# Patient Record
Sex: MALE | Race: BLACK OR AFRICAN AMERICAN
[De-identification: names, ages, dates, MRNs, and addresses within clinical notes are randomized per-mention and may not be internally consistent; named-entity substitution may affect disease eponyms.]

---

## 2020-12-11 ENCOUNTER — HOSPITAL ENCOUNTER (INPATIENT)
Dept: HOSPITAL 35 - ER | Age: 70
LOS: 4 days | Discharge: NURSING, REHAB OR LONG-TERM CARE | DRG: 871 | End: 2020-12-15
Attending: HOSPITALIST | Admitting: HOSPITALIST
Payer: COMMERCIAL

## 2020-12-11 VITALS — SYSTOLIC BLOOD PRESSURE: 110 MMHG | DIASTOLIC BLOOD PRESSURE: 61 MMHG

## 2020-12-11 VITALS — DIASTOLIC BLOOD PRESSURE: 63 MMHG | SYSTOLIC BLOOD PRESSURE: 135 MMHG

## 2020-12-11 VITALS — DIASTOLIC BLOOD PRESSURE: 61 MMHG | SYSTOLIC BLOOD PRESSURE: 118 MMHG

## 2020-12-11 VITALS — DIASTOLIC BLOOD PRESSURE: 64 MMHG | SYSTOLIC BLOOD PRESSURE: 106 MMHG

## 2020-12-11 VITALS — DIASTOLIC BLOOD PRESSURE: 60 MMHG | SYSTOLIC BLOOD PRESSURE: 112 MMHG

## 2020-12-11 VITALS — DIASTOLIC BLOOD PRESSURE: 71 MMHG | SYSTOLIC BLOOD PRESSURE: 123 MMHG

## 2020-12-11 VITALS — DIASTOLIC BLOOD PRESSURE: 64 MMHG | SYSTOLIC BLOOD PRESSURE: 126 MMHG

## 2020-12-11 VITALS — DIASTOLIC BLOOD PRESSURE: 56 MMHG | SYSTOLIC BLOOD PRESSURE: 88 MMHG

## 2020-12-11 VITALS — SYSTOLIC BLOOD PRESSURE: 113 MMHG | DIASTOLIC BLOOD PRESSURE: 69 MMHG

## 2020-12-11 VITALS — DIASTOLIC BLOOD PRESSURE: 69 MMHG | SYSTOLIC BLOOD PRESSURE: 106 MMHG

## 2020-12-11 VITALS — SYSTOLIC BLOOD PRESSURE: 125 MMHG | DIASTOLIC BLOOD PRESSURE: 74 MMHG

## 2020-12-11 VITALS — SYSTOLIC BLOOD PRESSURE: 109 MMHG | DIASTOLIC BLOOD PRESSURE: 61 MMHG

## 2020-12-11 VITALS — SYSTOLIC BLOOD PRESSURE: 129 MMHG | DIASTOLIC BLOOD PRESSURE: 69 MMHG

## 2020-12-11 VITALS — SYSTOLIC BLOOD PRESSURE: 116 MMHG | DIASTOLIC BLOOD PRESSURE: 65 MMHG

## 2020-12-11 VITALS — SYSTOLIC BLOOD PRESSURE: 105 MMHG | DIASTOLIC BLOOD PRESSURE: 61 MMHG

## 2020-12-11 VITALS — DIASTOLIC BLOOD PRESSURE: 81 MMHG | SYSTOLIC BLOOD PRESSURE: 126 MMHG

## 2020-12-11 VITALS — SYSTOLIC BLOOD PRESSURE: 91 MMHG | DIASTOLIC BLOOD PRESSURE: 58 MMHG

## 2020-12-11 VITALS — DIASTOLIC BLOOD PRESSURE: 66 MMHG | SYSTOLIC BLOOD PRESSURE: 114 MMHG

## 2020-12-11 VITALS — SYSTOLIC BLOOD PRESSURE: 120 MMHG | DIASTOLIC BLOOD PRESSURE: 65 MMHG

## 2020-12-11 VITALS — SYSTOLIC BLOOD PRESSURE: 109 MMHG | DIASTOLIC BLOOD PRESSURE: 72 MMHG

## 2020-12-11 VITALS — DIASTOLIC BLOOD PRESSURE: 66 MMHG | SYSTOLIC BLOOD PRESSURE: 122 MMHG

## 2020-12-11 VITALS — DIASTOLIC BLOOD PRESSURE: 61 MMHG | SYSTOLIC BLOOD PRESSURE: 102 MMHG

## 2020-12-11 VITALS — DIASTOLIC BLOOD PRESSURE: 61 MMHG | SYSTOLIC BLOOD PRESSURE: 142 MMHG

## 2020-12-11 VITALS — SYSTOLIC BLOOD PRESSURE: 121 MMHG | DIASTOLIC BLOOD PRESSURE: 73 MMHG

## 2020-12-11 VITALS — DIASTOLIC BLOOD PRESSURE: 67 MMHG | SYSTOLIC BLOOD PRESSURE: 119 MMHG

## 2020-12-11 VITALS — SYSTOLIC BLOOD PRESSURE: 113 MMHG | DIASTOLIC BLOOD PRESSURE: 75 MMHG

## 2020-12-11 VITALS — DIASTOLIC BLOOD PRESSURE: 68 MMHG | SYSTOLIC BLOOD PRESSURE: 113 MMHG

## 2020-12-11 VITALS — DIASTOLIC BLOOD PRESSURE: 63 MMHG | SYSTOLIC BLOOD PRESSURE: 117 MMHG

## 2020-12-11 VITALS — DIASTOLIC BLOOD PRESSURE: 72 MMHG | SYSTOLIC BLOOD PRESSURE: 124 MMHG

## 2020-12-11 VITALS — DIASTOLIC BLOOD PRESSURE: 75 MMHG | SYSTOLIC BLOOD PRESSURE: 133 MMHG

## 2020-12-11 VITALS — DIASTOLIC BLOOD PRESSURE: 70 MMHG | SYSTOLIC BLOOD PRESSURE: 118 MMHG

## 2020-12-11 VITALS — SYSTOLIC BLOOD PRESSURE: 120 MMHG | DIASTOLIC BLOOD PRESSURE: 68 MMHG

## 2020-12-11 VITALS — SYSTOLIC BLOOD PRESSURE: 114 MMHG | DIASTOLIC BLOOD PRESSURE: 72 MMHG

## 2020-12-11 VITALS — DIASTOLIC BLOOD PRESSURE: 77 MMHG | SYSTOLIC BLOOD PRESSURE: 141 MMHG

## 2020-12-11 VITALS — DIASTOLIC BLOOD PRESSURE: 64 MMHG | SYSTOLIC BLOOD PRESSURE: 127 MMHG

## 2020-12-11 VITALS — SYSTOLIC BLOOD PRESSURE: 115 MMHG | DIASTOLIC BLOOD PRESSURE: 67 MMHG

## 2020-12-11 VITALS — DIASTOLIC BLOOD PRESSURE: 72 MMHG | SYSTOLIC BLOOD PRESSURE: 135 MMHG

## 2020-12-11 VITALS — DIASTOLIC BLOOD PRESSURE: 72 MMHG | SYSTOLIC BLOOD PRESSURE: 128 MMHG

## 2020-12-11 VITALS — DIASTOLIC BLOOD PRESSURE: 60 MMHG | SYSTOLIC BLOOD PRESSURE: 96 MMHG

## 2020-12-11 VITALS — SYSTOLIC BLOOD PRESSURE: 122 MMHG | DIASTOLIC BLOOD PRESSURE: 62 MMHG

## 2020-12-11 VITALS — SYSTOLIC BLOOD PRESSURE: 88 MMHG | DIASTOLIC BLOOD PRESSURE: 58 MMHG

## 2020-12-11 VITALS — DIASTOLIC BLOOD PRESSURE: 69 MMHG | SYSTOLIC BLOOD PRESSURE: 122 MMHG

## 2020-12-11 VITALS — SYSTOLIC BLOOD PRESSURE: 95 MMHG | DIASTOLIC BLOOD PRESSURE: 59 MMHG

## 2020-12-11 VITALS — BODY MASS INDEX: 16.9 KG/M2 | HEIGHT: 72 IN | WEIGHT: 124.78 LBS

## 2020-12-11 VITALS — SYSTOLIC BLOOD PRESSURE: 108 MMHG | DIASTOLIC BLOOD PRESSURE: 69 MMHG

## 2020-12-11 VITALS — SYSTOLIC BLOOD PRESSURE: 135 MMHG | DIASTOLIC BLOOD PRESSURE: 69 MMHG

## 2020-12-11 VITALS — SYSTOLIC BLOOD PRESSURE: 110 MMHG | DIASTOLIC BLOOD PRESSURE: 67 MMHG

## 2020-12-11 VITALS — DIASTOLIC BLOOD PRESSURE: 74 MMHG | SYSTOLIC BLOOD PRESSURE: 123 MMHG

## 2020-12-11 VITALS — SYSTOLIC BLOOD PRESSURE: 117 MMHG | DIASTOLIC BLOOD PRESSURE: 79 MMHG

## 2020-12-11 VITALS — SYSTOLIC BLOOD PRESSURE: 140 MMHG | DIASTOLIC BLOOD PRESSURE: 81 MMHG

## 2020-12-11 VITALS — DIASTOLIC BLOOD PRESSURE: 68 MMHG | SYSTOLIC BLOOD PRESSURE: 123 MMHG

## 2020-12-11 VITALS — DIASTOLIC BLOOD PRESSURE: 62 MMHG | SYSTOLIC BLOOD PRESSURE: 107 MMHG

## 2020-12-11 VITALS — DIASTOLIC BLOOD PRESSURE: 71 MMHG | SYSTOLIC BLOOD PRESSURE: 106 MMHG

## 2020-12-11 VITALS — SYSTOLIC BLOOD PRESSURE: 125 MMHG | DIASTOLIC BLOOD PRESSURE: 66 MMHG

## 2020-12-11 VITALS — DIASTOLIC BLOOD PRESSURE: 66 MMHG | SYSTOLIC BLOOD PRESSURE: 115 MMHG

## 2020-12-11 VITALS — SYSTOLIC BLOOD PRESSURE: 137 MMHG | DIASTOLIC BLOOD PRESSURE: 72 MMHG

## 2020-12-11 VITALS — SYSTOLIC BLOOD PRESSURE: 118 MMHG | DIASTOLIC BLOOD PRESSURE: 65 MMHG

## 2020-12-11 VITALS — DIASTOLIC BLOOD PRESSURE: 63 MMHG | SYSTOLIC BLOOD PRESSURE: 111 MMHG

## 2020-12-11 VITALS — DIASTOLIC BLOOD PRESSURE: 94 MMHG | SYSTOLIC BLOOD PRESSURE: 120 MMHG

## 2020-12-11 VITALS — SYSTOLIC BLOOD PRESSURE: 124 MMHG | DIASTOLIC BLOOD PRESSURE: 64 MMHG

## 2020-12-11 VITALS — SYSTOLIC BLOOD PRESSURE: 126 MMHG | DIASTOLIC BLOOD PRESSURE: 70 MMHG

## 2020-12-11 VITALS — SYSTOLIC BLOOD PRESSURE: 129 MMHG | DIASTOLIC BLOOD PRESSURE: 74 MMHG

## 2020-12-11 VITALS — DIASTOLIC BLOOD PRESSURE: 67 MMHG | SYSTOLIC BLOOD PRESSURE: 118 MMHG

## 2020-12-11 VITALS — DIASTOLIC BLOOD PRESSURE: 71 MMHG | SYSTOLIC BLOOD PRESSURE: 124 MMHG

## 2020-12-11 VITALS — SYSTOLIC BLOOD PRESSURE: 135 MMHG | DIASTOLIC BLOOD PRESSURE: 79 MMHG

## 2020-12-11 VITALS — SYSTOLIC BLOOD PRESSURE: 119 MMHG | DIASTOLIC BLOOD PRESSURE: 68 MMHG

## 2020-12-11 VITALS — DIASTOLIC BLOOD PRESSURE: 59 MMHG | SYSTOLIC BLOOD PRESSURE: 107 MMHG

## 2020-12-11 VITALS — SYSTOLIC BLOOD PRESSURE: 136 MMHG | DIASTOLIC BLOOD PRESSURE: 64 MMHG

## 2020-12-11 VITALS — DIASTOLIC BLOOD PRESSURE: 65 MMHG | SYSTOLIC BLOOD PRESSURE: 120 MMHG

## 2020-12-11 VITALS — SYSTOLIC BLOOD PRESSURE: 112 MMHG | DIASTOLIC BLOOD PRESSURE: 63 MMHG

## 2020-12-11 VITALS — SYSTOLIC BLOOD PRESSURE: 115 MMHG | DIASTOLIC BLOOD PRESSURE: 71 MMHG

## 2020-12-11 VITALS — SYSTOLIC BLOOD PRESSURE: 135 MMHG | DIASTOLIC BLOOD PRESSURE: 63 MMHG

## 2020-12-11 VITALS — DIASTOLIC BLOOD PRESSURE: 67 MMHG | SYSTOLIC BLOOD PRESSURE: 113 MMHG

## 2020-12-11 VITALS — DIASTOLIC BLOOD PRESSURE: 70 MMHG | SYSTOLIC BLOOD PRESSURE: 120 MMHG

## 2020-12-11 VITALS — DIASTOLIC BLOOD PRESSURE: 61 MMHG | SYSTOLIC BLOOD PRESSURE: 120 MMHG

## 2020-12-11 VITALS — DIASTOLIC BLOOD PRESSURE: 62 MMHG | SYSTOLIC BLOOD PRESSURE: 104 MMHG

## 2020-12-11 DIAGNOSIS — Z66: ICD-10-CM

## 2020-12-11 DIAGNOSIS — I95.9: ICD-10-CM

## 2020-12-11 DIAGNOSIS — J91.8: ICD-10-CM

## 2020-12-11 DIAGNOSIS — D64.9: ICD-10-CM

## 2020-12-11 DIAGNOSIS — E87.1: ICD-10-CM

## 2020-12-11 DIAGNOSIS — E11.622: ICD-10-CM

## 2020-12-11 DIAGNOSIS — L89.154: ICD-10-CM

## 2020-12-11 DIAGNOSIS — Z93.1: ICD-10-CM

## 2020-12-11 DIAGNOSIS — D69.6: ICD-10-CM

## 2020-12-11 DIAGNOSIS — Z95.0: ICD-10-CM

## 2020-12-11 DIAGNOSIS — E78.5: ICD-10-CM

## 2020-12-11 DIAGNOSIS — E43: ICD-10-CM

## 2020-12-11 DIAGNOSIS — Z93.3: ICD-10-CM

## 2020-12-11 DIAGNOSIS — Z20.828: ICD-10-CM

## 2020-12-11 DIAGNOSIS — Z95.5: ICD-10-CM

## 2020-12-11 DIAGNOSIS — I25.10: ICD-10-CM

## 2020-12-11 DIAGNOSIS — Z86.73: ICD-10-CM

## 2020-12-11 DIAGNOSIS — I25.2: ICD-10-CM

## 2020-12-11 DIAGNOSIS — J96.21: ICD-10-CM

## 2020-12-11 DIAGNOSIS — Z79.899: ICD-10-CM

## 2020-12-11 DIAGNOSIS — J18.9: ICD-10-CM

## 2020-12-11 DIAGNOSIS — N17.9: ICD-10-CM

## 2020-12-11 DIAGNOSIS — A41.9: Primary | ICD-10-CM

## 2020-12-11 DIAGNOSIS — R13.10: ICD-10-CM

## 2020-12-11 DIAGNOSIS — Z51.5: ICD-10-CM

## 2020-12-11 DIAGNOSIS — I25.5: ICD-10-CM

## 2020-12-11 DIAGNOSIS — I10: ICD-10-CM

## 2020-12-11 DIAGNOSIS — K21.9: ICD-10-CM

## 2020-12-11 DIAGNOSIS — Z93.0: ICD-10-CM

## 2020-12-11 DIAGNOSIS — Z79.4: ICD-10-CM

## 2020-12-11 LAB
ALBUMIN SERPL-MCNC: 1.8 G/DL (ref 3.4–5)
ALT SERPL-CCNC: 31 U/L (ref 30–65)
ANION GAP SERPL CALC-SCNC: 6 MMOL/L (ref 7–16)
ANION GAP SERPL CALC-SCNC: 8 MMOL/L (ref 7–16)
ANION GAP SERPL CALC-SCNC: 8 MMOL/L (ref 7–16)
ANION GAP SERPL CALC-SCNC: 9 MMOL/L (ref 7–16)
APTT BLD: 28.7 SECONDS (ref 24.5–32.8)
AST SERPL-CCNC: 25 U/L (ref 15–37)
BACTERIA-REFLEX: (no result) /HPF
BASOPHILS NFR BLD AUTO: 0.4 % (ref 0–2)
BE(VIVO): 2.9 MMOL/L
BILIRUB SERPL-MCNC: 0.3 MG/DL (ref 0.2–1)
BILIRUB UR-MCNC: NEGATIVE MG/DL
BUN SERPL-MCNC: 34 MG/DL (ref 7–18)
BUN SERPL-MCNC: 35 MG/DL (ref 7–18)
BUN SERPL-MCNC: 36 MG/DL (ref 7–18)
BUN SERPL-MCNC: 41 MG/DL (ref 7–18)
CALCIUM SERPL-MCNC: 8.5 MG/DL (ref 8.5–10.1)
CALCIUM SERPL-MCNC: 8.8 MG/DL (ref 8.5–10.1)
CALCIUM SERPL-MCNC: 9.3 MG/DL (ref 8.5–10.1)
CALCIUM SERPL-MCNC: 9.6 MG/DL (ref 8.5–10.1)
CHLORIDE SERPL-SCNC: 100 MMOL/L (ref 98–107)
CHLORIDE SERPL-SCNC: 101 MMOL/L (ref 98–107)
CHLORIDE SERPL-SCNC: 93 MMOL/L (ref 98–107)
CHLORIDE SERPL-SCNC: 94 MMOL/L (ref 98–107)
CO2 SERPL-SCNC: 25 MMOL/L (ref 21–32)
CO2 SERPL-SCNC: 26 MMOL/L (ref 21–32)
CO2 SERPL-SCNC: 26 MMOL/L (ref 21–32)
CO2 SERPL-SCNC: 27 MMOL/L (ref 21–32)
COLOR UR: YELLOW
CREAT SERPL-MCNC: 0.8 MG/DL (ref 0.7–1.3)
CREAT SERPL-MCNC: 0.9 MG/DL (ref 0.7–1.3)
CREAT SERPL-MCNC: 0.9 MG/DL (ref 0.7–1.3)
CREAT SERPL-MCNC: 1 MG/DL (ref 0.7–1.3)
EOSINOPHIL NFR BLD: 0.4 % (ref 0–3)
ERYTHROCYTE [DISTWIDTH] IN BLOOD BY AUTOMATED COUNT: 17.1 % (ref 10.5–14.5)
GLUCOSE SERPL-MCNC: 209 MG/DL (ref 74–106)
GLUCOSE SERPL-MCNC: 267 MG/DL (ref 74–106)
GLUCOSE SERPL-MCNC: 301 MG/DL (ref 74–106)
GLUCOSE SERPL-MCNC: 78 MG/DL (ref 74–106)
GRANULOCYTES NFR BLD MANUAL: 84.9 % (ref 36–66)
HCO3 BLD-SCNC: 26 MMOL/L (ref 22–26)
HCT VFR BLD CALC: 34.3 % (ref 42–52)
HGB BLD-MCNC: 11.1 GM/DL (ref 14–18)
INR PPP: 1
KETONES UR STRIP-MCNC: NEGATIVE MG/DL
LYMPHOCYTES NFR BLD AUTO: 10.3 % (ref 24–44)
MAGNESIUM SERPL-MCNC: 1.6 MG/DL (ref 1.8–2.4)
MCH RBC QN AUTO: 28.8 PG (ref 26–34)
MCHC RBC AUTO-ENTMCNC: 32.3 G/DL (ref 28–37)
MCV RBC: 89 FL (ref 80–100)
MONOCYTES NFR BLD: 4 % (ref 1–8)
NEUTROPHILS # BLD: 15.2 THOU/UL (ref 1.4–8.2)
PCO2 BLD: 34.9 MMHG (ref 35–45)
PLATELET # BLD: 525 THOU/UL (ref 150–400)
PO2 BLD: 41.9 MMHG (ref 80–100)
POTASSIUM SERPL-SCNC: 3.9 MMOL/L (ref 3.5–5.1)
POTASSIUM SERPL-SCNC: 4.2 MMOL/L (ref 3.5–5.1)
POTASSIUM SERPL-SCNC: 5 MMOL/L (ref 3.5–5.1)
POTASSIUM SERPL-SCNC: 5.1 MMOL/L (ref 3.5–5.1)
PROT SERPL-MCNC: 6.8 G/DL (ref 6.4–8.2)
PROTHROMBIN TIME: 10.5 SECONDS (ref 9.3–11.4)
RBC # BLD AUTO: 3.86 MIL/UL (ref 4.5–6)
RBC # UR STRIP: NEGATIVE /UL
SODIUM SERPL-SCNC: 127 MMOL/L (ref 136–145)
SODIUM SERPL-SCNC: 127 MMOL/L (ref 136–145)
SODIUM SERPL-SCNC: 134 MMOL/L (ref 136–145)
SODIUM SERPL-SCNC: 135 MMOL/L (ref 136–145)
SP GR UR STRIP: 1.02 (ref 1–1.03)
SQUAMOUS: (no result) /LPF (ref 0–3)
TROPONIN I SERPL-MCNC: <0.06 NG/ML (ref ?–0.06)
URINE CLARITY: CLEAR
URINE GLUCOSE-RANDOM*: NEGATIVE
URINE LEUKOCYTES-REFLEX: (no result)
URINE NITRITE-REFLEX: NEGATIVE
URINE PROTEIN (DIPSTICK): (no result)
URINE WBC-REFLEX: (no result) /HPF (ref 0–5)
UROBILINOGEN UR STRIP-ACNC: 0.2 E.U./DL (ref 0.2–1)
WBC # BLD AUTO: 18 THOU/UL (ref 4–11)

## 2020-12-11 PROCEDURE — 0W993ZZ DRAINAGE OF RIGHT PLEURAL CAVITY, PERCUTANEOUS APPROACH: ICD-10-PCS | Performed by: RADIOLOGY

## 2020-12-11 PROCEDURE — 10078: CPT

## 2020-12-11 PROCEDURE — 10081 I&D PILONIDAL CYST COMP: CPT

## 2020-12-11 PROCEDURE — 02HV33Z INSERTION OF INFUSION DEVICE INTO SUPERIOR VENA CAVA, PERCUTANEOUS APPROACH: ICD-10-PCS | Performed by: RADIOLOGY

## 2020-12-11 NOTE — NUR
Nutrition: REC restart pt's usual regimen Glucerna 1.2 at 70 mL/hr, with 250
mL H20 flushes q 6 hrs with beneprotein in each flush.

## 2020-12-11 NOTE — NUR
chart review. new pt today. unable to visit with keron rt isolation pending
covid. cm tried calling his son, no answer. cm reached out to [facility]
[place]. spoke with renaldo at [facility]. " he is ltc, he came with that wound to
life [facility]. he is out of skilled days, he came from 2 other nursing homes and
before that he was a [facility]. we have been tying to get son to make his
comfort and dnr, son just will not make his dnr."/renaldo. he is total
assistance with adls. has trach and peg. he is ltc at [facility]. will cont
following as needed for dc needs.

## 2020-12-11 NOTE — NUR
1455- Three Hour Sepsis Reassessment:
Physician Notified: Dr. Ann
Pt Status: More alert and awake
MAP:: 80mmHg
CVP: 8
Lactate: was 2.4, new lab sent
UOP: 200ml
BMP: Next one at 1600

## 2020-12-11 NOTE — NUR
PT ARRIVED TO HE UNIT AT 1038 WITH ED RN TRANSPORT W/ O2, PT DID NOT HAVE
BELONGINGS UPON ARRIVAL. PT'S SON VICKI PADRON JR, THE DPOA, WAS ALSO
NOTIFIED OF PTS ADMISSION. ALL WOUND PICTURES WERE TAKEN WITH THE ASSISTANCE
OF TITUS RODRÍGUEZ RN, AND COMPLETE CHLORHEXEDINE BATH WAS PROVIDED, PT WAS
SEEN TO HAVE COPIOUS AMOUNT OF THICK TAN/WHITE EXPECTORANTS. PT HAD EXTENSIVE
WOUND UPON ARRIVAL TO BILATERAL HEELS, COCCYX, HIP WOUND VACUMM, PT ALSO HAD A
LLQ COLOSTOMY, AND PEG TUBE. TUBE FEEDING HAS BEEN INITIATED. PT HAS BEEN
PLACED ON SEPSIS PROTOCOL PER POLICY, MEDICATIONS WERE ADMINISTERED, BLOOD
SUGAR IS BEING CHECKED HOURLY; INSULIN GTT MANAGED, PT RECEIVED A CENTRAL
LINE, THORACENTESIS TODAY. PT IS EASILY AROUSABLE, IS ABLE TO OPEN EYES WHEN
RN CALLS NAME AND APPEARS TO BE RETURNING TO BASELINE MENTATION, AS WELL AS
MAINTAINING 100 O2 THREFORE, PT IS PROGRESSING TOWARDS DISCHARGE.

## 2020-12-11 NOTE — NUR
WOUND CONSULT;
INITAL ASSESSMENT;  A STAGE 4 SACRAL WOUND WITH UNDERMINING FROM 10-2:00.
BONE IS PRESENT IN THE WOUND BED. THE WOUND HAS A SLIGHT ODOR.  THE WOUND
MEANSURES APPROX 10 X 10 X 2.5 CM. THE LEFT ELBOW HAS A BRUISE SUPICIOUS OF A
DTI.  THE HEELS HAVE RESOLVING PRESSURE INJURIES WORSE ON THE LEFT THAN THE
RIGHT.  CURRENTLY USING PRAFO BOOTS. PICTURES TAKEN.
 
RECOMMENDATIONS;  UNTIL DR PUGH ASSESSMENT USE BORDER FOAMS TO THE HEELS A
NS MOIST GAUZE TO THE SACRUM,ABD,TAPE. BETADINE TO THE RIGHT ELBOW.
 
RN'S PRESENT

## 2020-12-11 NOTE — NUR
1220 - SPOKE WITH SON VICKI PADRON JR. TO PROVIDE UPDATE AND RETREIVE
CONSENT FOR CENTRAL LINE INSERTION BY IV TEAM. PER VICKI PUGACHARBEL  (SON),
[facility] AND
TRANSFER TO [facility], PT HAS BEEN ADMITED TO THIS HOSPITAL BEFORE IN THE PAST, AND
VICKI DUMONT(SON) IS THE DPOA FOR THE PATIENT AND SHOULD BE IN FILE. PT'S WIFE
JOHNNIE PADRNO IS  AND SHOULD NOT BE A POINT OF CONTACT.

## 2020-12-11 NOTE — NUR
VAT CONSULTED FOR CVL PLACEMENT. 6FR TL JACC CATHETER INSERTED TO RIGHT IJ,
25CM CATHETER WITH 9CM EXTERNAL. PT TOLERATED WELL. AWAITING CXR.

## 2020-12-12 VITALS — SYSTOLIC BLOOD PRESSURE: 116 MMHG | DIASTOLIC BLOOD PRESSURE: 68 MMHG

## 2020-12-12 VITALS — DIASTOLIC BLOOD PRESSURE: 64 MMHG | SYSTOLIC BLOOD PRESSURE: 121 MMHG

## 2020-12-12 VITALS — SYSTOLIC BLOOD PRESSURE: 114 MMHG | DIASTOLIC BLOOD PRESSURE: 64 MMHG

## 2020-12-12 VITALS — DIASTOLIC BLOOD PRESSURE: 66 MMHG | SYSTOLIC BLOOD PRESSURE: 120 MMHG

## 2020-12-12 VITALS — SYSTOLIC BLOOD PRESSURE: 118 MMHG | DIASTOLIC BLOOD PRESSURE: 65 MMHG

## 2020-12-12 VITALS — DIASTOLIC BLOOD PRESSURE: 69 MMHG | SYSTOLIC BLOOD PRESSURE: 127 MMHG

## 2020-12-12 VITALS — SYSTOLIC BLOOD PRESSURE: 120 MMHG | DIASTOLIC BLOOD PRESSURE: 65 MMHG

## 2020-12-12 VITALS — SYSTOLIC BLOOD PRESSURE: 123 MMHG | DIASTOLIC BLOOD PRESSURE: 65 MMHG

## 2020-12-12 VITALS — DIASTOLIC BLOOD PRESSURE: 62 MMHG | SYSTOLIC BLOOD PRESSURE: 116 MMHG

## 2020-12-12 VITALS — DIASTOLIC BLOOD PRESSURE: 66 MMHG | SYSTOLIC BLOOD PRESSURE: 129 MMHG

## 2020-12-12 VITALS — DIASTOLIC BLOOD PRESSURE: 70 MMHG | SYSTOLIC BLOOD PRESSURE: 121 MMHG

## 2020-12-12 VITALS — SYSTOLIC BLOOD PRESSURE: 127 MMHG | DIASTOLIC BLOOD PRESSURE: 66 MMHG

## 2020-12-12 VITALS — SYSTOLIC BLOOD PRESSURE: 124 MMHG | DIASTOLIC BLOOD PRESSURE: 65 MMHG

## 2020-12-12 VITALS — DIASTOLIC BLOOD PRESSURE: 69 MMHG | SYSTOLIC BLOOD PRESSURE: 138 MMHG

## 2020-12-12 VITALS — SYSTOLIC BLOOD PRESSURE: 114 MMHG | DIASTOLIC BLOOD PRESSURE: 71 MMHG

## 2020-12-12 VITALS — SYSTOLIC BLOOD PRESSURE: 115 MMHG | DIASTOLIC BLOOD PRESSURE: 66 MMHG

## 2020-12-12 VITALS — SYSTOLIC BLOOD PRESSURE: 124 MMHG | DIASTOLIC BLOOD PRESSURE: 66 MMHG

## 2020-12-12 VITALS — DIASTOLIC BLOOD PRESSURE: 62 MMHG | SYSTOLIC BLOOD PRESSURE: 111 MMHG

## 2020-12-12 VITALS — DIASTOLIC BLOOD PRESSURE: 68 MMHG | SYSTOLIC BLOOD PRESSURE: 124 MMHG

## 2020-12-12 VITALS — DIASTOLIC BLOOD PRESSURE: 84 MMHG | SYSTOLIC BLOOD PRESSURE: 133 MMHG

## 2020-12-12 VITALS — SYSTOLIC BLOOD PRESSURE: 123 MMHG | DIASTOLIC BLOOD PRESSURE: 66 MMHG

## 2020-12-12 VITALS — SYSTOLIC BLOOD PRESSURE: 11 MMHG | DIASTOLIC BLOOD PRESSURE: 59 MMHG

## 2020-12-12 VITALS — SYSTOLIC BLOOD PRESSURE: 110 MMHG | DIASTOLIC BLOOD PRESSURE: 67 MMHG

## 2020-12-12 VITALS — SYSTOLIC BLOOD PRESSURE: 119 MMHG | DIASTOLIC BLOOD PRESSURE: 62 MMHG

## 2020-12-12 VITALS — DIASTOLIC BLOOD PRESSURE: 66 MMHG | SYSTOLIC BLOOD PRESSURE: 127 MMHG

## 2020-12-12 VITALS — SYSTOLIC BLOOD PRESSURE: 121 MMHG | DIASTOLIC BLOOD PRESSURE: 65 MMHG

## 2020-12-12 VITALS — DIASTOLIC BLOOD PRESSURE: 65 MMHG | SYSTOLIC BLOOD PRESSURE: 123 MMHG

## 2020-12-12 VITALS — DIASTOLIC BLOOD PRESSURE: 67 MMHG | SYSTOLIC BLOOD PRESSURE: 129 MMHG

## 2020-12-12 VITALS — DIASTOLIC BLOOD PRESSURE: 60 MMHG | SYSTOLIC BLOOD PRESSURE: 120 MMHG

## 2020-12-12 VITALS — DIASTOLIC BLOOD PRESSURE: 67 MMHG | SYSTOLIC BLOOD PRESSURE: 127 MMHG

## 2020-12-12 VITALS — SYSTOLIC BLOOD PRESSURE: 126 MMHG | DIASTOLIC BLOOD PRESSURE: 64 MMHG

## 2020-12-12 VITALS — DIASTOLIC BLOOD PRESSURE: 66 MMHG | SYSTOLIC BLOOD PRESSURE: 122 MMHG

## 2020-12-12 VITALS — DIASTOLIC BLOOD PRESSURE: 65 MMHG | SYSTOLIC BLOOD PRESSURE: 122 MMHG

## 2020-12-12 VITALS — SYSTOLIC BLOOD PRESSURE: 127 MMHG | DIASTOLIC BLOOD PRESSURE: 67 MMHG

## 2020-12-12 VITALS — SYSTOLIC BLOOD PRESSURE: 117 MMHG | DIASTOLIC BLOOD PRESSURE: 69 MMHG

## 2020-12-12 VITALS — SYSTOLIC BLOOD PRESSURE: 125 MMHG | DIASTOLIC BLOOD PRESSURE: 67 MMHG

## 2020-12-12 VITALS — SYSTOLIC BLOOD PRESSURE: 111 MMHG | DIASTOLIC BLOOD PRESSURE: 61 MMHG

## 2020-12-12 VITALS — SYSTOLIC BLOOD PRESSURE: 119 MMHG | DIASTOLIC BLOOD PRESSURE: 63 MMHG

## 2020-12-12 VITALS — SYSTOLIC BLOOD PRESSURE: 112 MMHG | DIASTOLIC BLOOD PRESSURE: 64 MMHG

## 2020-12-12 VITALS — SYSTOLIC BLOOD PRESSURE: 122 MMHG | DIASTOLIC BLOOD PRESSURE: 64 MMHG

## 2020-12-12 VITALS — SYSTOLIC BLOOD PRESSURE: 118 MMHG | DIASTOLIC BLOOD PRESSURE: 68 MMHG

## 2020-12-12 VITALS — SYSTOLIC BLOOD PRESSURE: 111 MMHG | DIASTOLIC BLOOD PRESSURE: 68 MMHG

## 2020-12-12 VITALS — SYSTOLIC BLOOD PRESSURE: 121 MMHG | DIASTOLIC BLOOD PRESSURE: 62 MMHG

## 2020-12-12 VITALS — DIASTOLIC BLOOD PRESSURE: 65 MMHG | SYSTOLIC BLOOD PRESSURE: 127 MMHG

## 2020-12-12 VITALS — SYSTOLIC BLOOD PRESSURE: 117 MMHG | DIASTOLIC BLOOD PRESSURE: 59 MMHG

## 2020-12-12 VITALS — SYSTOLIC BLOOD PRESSURE: 117 MMHG | DIASTOLIC BLOOD PRESSURE: 63 MMHG

## 2020-12-12 VITALS — DIASTOLIC BLOOD PRESSURE: 66 MMHG | SYSTOLIC BLOOD PRESSURE: 115 MMHG

## 2020-12-12 VITALS — SYSTOLIC BLOOD PRESSURE: 138 MMHG | DIASTOLIC BLOOD PRESSURE: 88 MMHG

## 2020-12-12 VITALS — SYSTOLIC BLOOD PRESSURE: 134 MMHG | DIASTOLIC BLOOD PRESSURE: 69 MMHG

## 2020-12-12 VITALS — DIASTOLIC BLOOD PRESSURE: 64 MMHG | SYSTOLIC BLOOD PRESSURE: 124 MMHG

## 2020-12-12 VITALS — SYSTOLIC BLOOD PRESSURE: 120 MMHG | DIASTOLIC BLOOD PRESSURE: 67 MMHG

## 2020-12-12 VITALS — DIASTOLIC BLOOD PRESSURE: 67 MMHG | SYSTOLIC BLOOD PRESSURE: 121 MMHG

## 2020-12-12 VITALS — DIASTOLIC BLOOD PRESSURE: 63 MMHG | SYSTOLIC BLOOD PRESSURE: 123 MMHG

## 2020-12-12 VITALS — SYSTOLIC BLOOD PRESSURE: 118 MMHG | DIASTOLIC BLOOD PRESSURE: 60 MMHG

## 2020-12-12 VITALS — SYSTOLIC BLOOD PRESSURE: 120 MMHG | DIASTOLIC BLOOD PRESSURE: 60 MMHG

## 2020-12-12 LAB
ANION GAP SERPL CALC-SCNC: 7 MMOL/L (ref 7–16)
ANION GAP SERPL CALC-SCNC: 8 MMOL/L (ref 7–16)
BUN SERPL-MCNC: 31 MG/DL (ref 7–18)
BUN SERPL-MCNC: 33 MG/DL (ref 7–18)
CALCIUM SERPL-MCNC: 8.6 MG/DL (ref 8.5–10.1)
CALCIUM SERPL-MCNC: 8.8 MG/DL (ref 8.5–10.1)
CHLORIDE SERPL-SCNC: 102 MMOL/L (ref 98–107)
CHLORIDE SERPL-SCNC: 104 MMOL/L (ref 98–107)
CO2 SERPL-SCNC: 26 MMOL/L (ref 21–32)
CO2 SERPL-SCNC: 27 MMOL/L (ref 21–32)
CREAT SERPL-MCNC: 0.7 MG/DL (ref 0.7–1.3)
CREAT SERPL-MCNC: 0.7 MG/DL (ref 0.7–1.3)
ERYTHROCYTE [DISTWIDTH] IN BLOOD BY AUTOMATED COUNT: 17.5 % (ref 10.5–14.5)
GLUCOSE SERPL-MCNC: 147 MG/DL (ref 74–106)
GLUCOSE SERPL-MCNC: 151 MG/DL (ref 74–106)
HCT VFR BLD CALC: 26.7 % (ref 42–52)
HGB BLD-MCNC: 8.8 GM/DL (ref 14–18)
MAGNESIUM SERPL-MCNC: 1.7 MG/DL (ref 1.8–2.4)
MCH RBC QN AUTO: 29.6 PG (ref 26–34)
MCHC RBC AUTO-ENTMCNC: 33 G/DL (ref 28–37)
MCV RBC: 89.7 FL (ref 80–100)
PLATELET # BLD: 391 THOU/UL (ref 150–400)
POTASSIUM SERPL-SCNC: 4.1 MMOL/L (ref 3.5–5.1)
POTASSIUM SERPL-SCNC: 4.1 MMOL/L (ref 3.5–5.1)
RBC # BLD AUTO: 2.98 MIL/UL (ref 4.5–6)
SODIUM SERPL-SCNC: 136 MMOL/L (ref 136–145)
SODIUM SERPL-SCNC: 138 MMOL/L (ref 136–145)
WBC # BLD AUTO: 10.8 THOU/UL (ref 4–11)

## 2020-12-12 NOTE — NUR
RECEIVED PT'S CARE AROUND 0710; PT. ON BED; RESTING WITH EYES CLOSED; T TUBE
ON PLACED; AT 50% 02; INSULIN GTT RUNNING AT 1 UNIT; VPACED ON THE MONITOR;
DR. SMITH CONSULT;  DURING AM ASSESSMENT PT. ALERT TO NAME; FOLLOW
COMMANDS; AM MEDICATION GIVEN; PER DR. SMITH INSULIN GTT D/C; FEEDING
RESIDUAL BELOW 100 ML; VS WNL; DURING THE AFTERNOON AND EVENING; PT. AWAKE;
ABLE TO ANSWYER YES/NO QUESTION; NO C/O PAIN; WOUND CARE PERFORMED; FLUIDS
D/C; URINE AND BLOOD CULTURE ON THE SYSTEM; DR. CHUA AND DR. AGUILAR
NOTIFIED; TURNED FROM SIDE TO SIDE THROUGH THE DAY; CALL RETURNED TO PT'S SON;
UPDATED ABOUT PT'S HEALTH STATUS AND POC; PER DR. ANNE CULLEN TO TRANSFER TO CCU;
PER DR. HARSHIL CULLEN TO D/C CVP LINE; MAGNESIUM REPLACED; ASSESSMENT AS
CHARGED; FOLLOWING POC; WILL PASS ON REPORT;

## 2020-12-13 VITALS — SYSTOLIC BLOOD PRESSURE: 126 MMHG | DIASTOLIC BLOOD PRESSURE: 66 MMHG

## 2020-12-13 VITALS — DIASTOLIC BLOOD PRESSURE: 72 MMHG | SYSTOLIC BLOOD PRESSURE: 138 MMHG

## 2020-12-13 VITALS — DIASTOLIC BLOOD PRESSURE: 78 MMHG | SYSTOLIC BLOOD PRESSURE: 150 MMHG

## 2020-12-13 VITALS — SYSTOLIC BLOOD PRESSURE: 142 MMHG | DIASTOLIC BLOOD PRESSURE: 73 MMHG

## 2020-12-13 VITALS — DIASTOLIC BLOOD PRESSURE: 65 MMHG | SYSTOLIC BLOOD PRESSURE: 128 MMHG

## 2020-12-13 VITALS — SYSTOLIC BLOOD PRESSURE: 140 MMHG | DIASTOLIC BLOOD PRESSURE: 73 MMHG

## 2020-12-13 VITALS — DIASTOLIC BLOOD PRESSURE: 71 MMHG | SYSTOLIC BLOOD PRESSURE: 149 MMHG

## 2020-12-13 VITALS — DIASTOLIC BLOOD PRESSURE: 73 MMHG | SYSTOLIC BLOOD PRESSURE: 136 MMHG

## 2020-12-13 VITALS — SYSTOLIC BLOOD PRESSURE: 139 MMHG | DIASTOLIC BLOOD PRESSURE: 75 MMHG

## 2020-12-13 VITALS — SYSTOLIC BLOOD PRESSURE: 139 MMHG | DIASTOLIC BLOOD PRESSURE: 69 MMHG

## 2020-12-13 VITALS — SYSTOLIC BLOOD PRESSURE: 140 MMHG | DIASTOLIC BLOOD PRESSURE: 74 MMHG

## 2020-12-13 VITALS — DIASTOLIC BLOOD PRESSURE: 75 MMHG | SYSTOLIC BLOOD PRESSURE: 138 MMHG

## 2020-12-13 VITALS — SYSTOLIC BLOOD PRESSURE: 118 MMHG | DIASTOLIC BLOOD PRESSURE: 63 MMHG

## 2020-12-13 VITALS — DIASTOLIC BLOOD PRESSURE: 77 MMHG | SYSTOLIC BLOOD PRESSURE: 138 MMHG

## 2020-12-13 VITALS — DIASTOLIC BLOOD PRESSURE: 72 MMHG | SYSTOLIC BLOOD PRESSURE: 139 MMHG

## 2020-12-13 VITALS — SYSTOLIC BLOOD PRESSURE: 135 MMHG | DIASTOLIC BLOOD PRESSURE: 72 MMHG

## 2020-12-13 VITALS — DIASTOLIC BLOOD PRESSURE: 74 MMHG | SYSTOLIC BLOOD PRESSURE: 132 MMHG

## 2020-12-13 VITALS — SYSTOLIC BLOOD PRESSURE: 130 MMHG | DIASTOLIC BLOOD PRESSURE: 67 MMHG

## 2020-12-13 VITALS — DIASTOLIC BLOOD PRESSURE: 72 MMHG | SYSTOLIC BLOOD PRESSURE: 145 MMHG

## 2020-12-13 VITALS — DIASTOLIC BLOOD PRESSURE: 73 MMHG | SYSTOLIC BLOOD PRESSURE: 138 MMHG

## 2020-12-13 VITALS — SYSTOLIC BLOOD PRESSURE: 131 MMHG | DIASTOLIC BLOOD PRESSURE: 70 MMHG

## 2020-12-13 VITALS — DIASTOLIC BLOOD PRESSURE: 76 MMHG | SYSTOLIC BLOOD PRESSURE: 141 MMHG

## 2020-12-13 VITALS — DIASTOLIC BLOOD PRESSURE: 75 MMHG | SYSTOLIC BLOOD PRESSURE: 130 MMHG

## 2020-12-13 VITALS — DIASTOLIC BLOOD PRESSURE: 60 MMHG | SYSTOLIC BLOOD PRESSURE: 122 MMHG

## 2020-12-13 VITALS — DIASTOLIC BLOOD PRESSURE: 71 MMHG | SYSTOLIC BLOOD PRESSURE: 128 MMHG

## 2020-12-13 VITALS — DIASTOLIC BLOOD PRESSURE: 74 MMHG | SYSTOLIC BLOOD PRESSURE: 131 MMHG

## 2020-12-13 VITALS — DIASTOLIC BLOOD PRESSURE: 85 MMHG | SYSTOLIC BLOOD PRESSURE: 127 MMHG

## 2020-12-13 VITALS — DIASTOLIC BLOOD PRESSURE: 70 MMHG | SYSTOLIC BLOOD PRESSURE: 139 MMHG

## 2020-12-13 VITALS — DIASTOLIC BLOOD PRESSURE: 74 MMHG | SYSTOLIC BLOOD PRESSURE: 136 MMHG

## 2020-12-13 VITALS — DIASTOLIC BLOOD PRESSURE: 75 MMHG | SYSTOLIC BLOOD PRESSURE: 142 MMHG

## 2020-12-13 VITALS — DIASTOLIC BLOOD PRESSURE: 71 MMHG | SYSTOLIC BLOOD PRESSURE: 130 MMHG

## 2020-12-13 LAB
ANION GAP SERPL CALC-SCNC: 10 MMOL/L (ref 7–16)
ANION GAP SERPL CALC-SCNC: 6 MMOL/L (ref 7–16)
BUN SERPL-MCNC: 24 MG/DL (ref 7–18)
BUN SERPL-MCNC: 24 MG/DL (ref 7–18)
CALCIUM SERPL-MCNC: 9.2 MG/DL (ref 8.5–10.1)
CALCIUM SERPL-MCNC: 9.3 MG/DL (ref 8.5–10.1)
CHLORIDE SERPL-SCNC: 108 MMOL/L (ref 98–107)
CHLORIDE SERPL-SCNC: 109 MMOL/L (ref 98–107)
CO2 SERPL-SCNC: 24 MMOL/L (ref 21–32)
CO2 SERPL-SCNC: 25 MMOL/L (ref 21–32)
CREAT SERPL-MCNC: 0.7 MG/DL (ref 0.7–1.3)
CREAT SERPL-MCNC: 0.8 MG/DL (ref 0.7–1.3)
ERYTHROCYTE [DISTWIDTH] IN BLOOD BY AUTOMATED COUNT: 17.7 % (ref 10.5–14.5)
EST. AVERAGE GLUCOSE BLD GHB EST-MCNC: 186 MG/DL
GLUCOSE SERPL-MCNC: 158 MG/DL (ref 74–106)
GLUCOSE SERPL-MCNC: 198 MG/DL (ref 74–106)
GLYCOHEMOGLOBIN (HGB A1C): 8.1 % (ref 4.8–5.6)
HCT VFR BLD CALC: 27 % (ref 42–52)
HGB BLD-MCNC: 8.7 GM/DL (ref 14–18)
MAGNESIUM SERPL-MCNC: 2.2 MG/DL (ref 1.8–2.4)
MCH RBC QN AUTO: 29.6 PG (ref 26–34)
MCHC RBC AUTO-ENTMCNC: 32.3 G/DL (ref 28–37)
MCV RBC: 91.6 FL (ref 80–100)
PLATELET # BLD: 426 THOU/UL (ref 150–400)
POTASSIUM SERPL-SCNC: 4.2 MMOL/L (ref 3.5–5.1)
POTASSIUM SERPL-SCNC: 4.4 MMOL/L (ref 3.5–5.1)
RBC # BLD AUTO: 2.95 MIL/UL (ref 4.5–6)
SODIUM SERPL-SCNC: 140 MMOL/L (ref 136–145)
SODIUM SERPL-SCNC: 142 MMOL/L (ref 136–145)
WBC # BLD AUTO: 10.9 THOU/UL (ref 4–11)

## 2020-12-13 NOTE — NUR
ASSESSMENTS AND INTERVENTIONS AS DOCCUMENTED. PATIENT RESTING THROUGH OUT
SHIFT NO MAJOR CONCERNS. WOUND CARE DOCTOR ROUNDING ON PATIENT, DRESSING
CHANGED. OSTOMY BAG REMOVED AND CHAGED BY RN.

## 2020-12-13 NOTE — HC
[facility]
[address]
[place]   [address]                     CONSULTATION                  
_______________________________________________________________________________
 
Name:       VICKI PADRON               Room #:         238-P       ADM IN  
M.R.#:      [id number]                       Account #:      [id number]  
Admission:  12/11/20    Attend Phys:    Rickie Lange MD 
Discharge:              Date of Birth:  [birth year]  
                                                          Report #: [id number]
                                                                    [id number]   
_______________________________________________________________________________
THIS REPORT FOR:  
 
cc:  Lillig,Mary Ann DO Lillig,Phong Ortega MD                                         ~
 
DATE OF SERVICE:  12/12/2020
 
 
ENDOCRINE CONSULTATION
 
CONSULTING PHYSICIAN:  Dr. Lange.
 
REASON FOR CONSULTATION:  Type 2 diabetes mellitus.
 
HISTORY OF PRESENT ILLNESS:  This is a 70-year-old male patient, who has a
complex medical background including a history of CVA, CAD, ischemic
cardiomyopathy, type 2 diabetes mellitus and hypertension.  The patient is
maintained on tube feedings via PEG tube and he has a chronic tracheostomy in
place.  He was presented to the ER yesterday in the context of pneumonia and
septicemia.
 
The patient's maintenance regimen consists of Lantus insulin 8 units daily in
addition to NovoLog insulin in some capacity.  Upon arrival, the patient was
placed on intravenous insulin therapy and remains so at an infusion rate of 1
unit per hour.  The patient is not able to converse and as such, was not able to
provide details on history of diabetic complications or other pertinent clinical
positives.
 
He is hyperlipidemic and is maintained on atorvastatin 40 mg daily.  He is also
hypertensive and is maintained on combination of carvedilol 12.5 mg b.i.d.,
metoprolol twice a day as well.
 
REVIEW OF SYSTEMS:  As per her chart and medical staff.
PULMONARY:  Shortness of breath and cough.
CARDIAC:  No chest pain or palpitation.
NEUROLOGY:  No loss of consciousness or seizure activity.
GASTROINTESTINAL:  No nausea, vomiting or reported abdominal pain.
 
Otherwise, review of systems noncontributory other than those mentioned in HPI.
 
PAST MEDICAL HISTORY:
1.  CVA.
2.  CAD.
3.  Ischemic cardiomyopathy.  Baseline ejection fraction 35%.
4.  Type 2 diabetes mellitus.
 
 
 
[address]   [address]                     CONSULTATION                  
_______________________________________________________________________________
 
Name:       VICKI PADRON               Room #:         238-P       ADM IN  
M.R.#:      [id number]                       Account #:      [id number]  
Admission:  12/11/20    Attend Phys:    Rickie Lange MD 
Discharge:              Date of Birth:  [birth year]  
                                                          Report #: [id number]
                                                                    [id number]   
_______________________________________________________________________________
 
5.  Hypertension.
6.  Hyperlipidemia.
7.  Gastroesophageal reflux disease.
8.  Chronic PEG placement.
9.  Chronic trach placement.
 
OUTPATIENT MEDICATIONS:  Include:
1.  Tylenol 650 mg every 6 hours p.r.n.
2.  Fluconazole 150 mg per tube daily.
3.  Lovenox subcutaneous 30 mg daily.
4.  Hydrochlorothiazide 12.5 mg daily.
5.  Lantus insulin 10 units daily.
6.  Lopressor b.i.d.
7.  Pantoprazole 40 mg daily.
8.  NovoLog insulin.
9.  Metformin 1000 mg b.i.d.
10.  Atorvastatin 40 mg daily.
12.  Carvedilol 12.5 mg b.i.d.
13.  Amiodarone 200 mg daily.
14.  Aspirin 81 mg daily.
14.  Spironolactone 50 mg with breakfast.
 
ALLERGIES:  No known drug allergies.
 
FAMILY HISTORY:  Noncontributory.
 
SOCIAL HISTORY:  The patient lives at long-term care facility.
 
PHYSICAL EXAMINATION:
GENERAL:  Cachectic male patient, who is not in apparent pain or distress.
VITAL SIGNS:  Blood pressure is 120/65, heart rate is 70 beats per minute,
respiration 20 per minute, temperature 35.8 degrees Celsius.
CONSTITUTIONAL:  He is lying in bed, appears cachectic, not in apparent
distress.
HEENT:  Anicteric sclerae.  Intact extraocular motions.
NECK:  Noted for a trach placement.  No lymphadenopathy.
CHEST:  Noted for limited air entry bilaterally with coarse breath sounds,
rales, but not crackles.
HEART:  Regular rate and rhythm with a systolic ejection murmur.
ABDOMEN:  PEG tube is in place, otherwise soft, lax.  No guarding.  Sluggish
bowel sounds.
EXTREMITIES:  Lower extremity:  No skin breaks or ulcerations.
NEUROLOGIC:  Awake, alert, moves upper extremities spontaneously.
PSYCH:  Flat affect, not interactive, nonverbal.
 
 
 
 
[address]   [address]                     CONSULTATION                  
_______________________________________________________________________________
 
Name:       VICKI PADRON               Room #:         238-P       [facility] [place]  
M.R.#:      [id number]                       Account #:      [id number]  
Admission:  12/11/20    Attend Phys:    Rickie Lange MD 
Discharge:              Date of Birth:  [birth year]  
                                                          Report #: [id number]
                                                                    [id number]   
_______________________________________________________________________________
 
LABORATORY DATA:  Blood glucose was at 309 on arrival and has been consistently
under 140 mg/dL over the past several hours.  Sodium 138, potassium 4.1,
chloride 104, CO2 of 26, anion gap 8, BUN 31, creatinine 0.7, lipase 172, total
bilirubin 0.3, direct bilirubin 0.1, calcium 8.6, magnesium 1.7, alkaline
phosphatase 298, ALT 31, total protein 6.8, albumin 1.8, EGFR 130.  Lactic acid
0.9.  White blood count 10.8, hemoglobin 8.8, hematocrit 26.7, platelets 391. 
Hemoglobin A1c on 09/22/2015 at 8.9.
 
ASSESSMENT AND PLAN:
1.  Type 2 diabetes test.  The patient's baseline regimen consists of low dose
Lantus therapy in addition to metformin therapy.  His IV insulin needs have been
minimal since arrival and are now averaging at 1 unit per hour.  That said, I
will switch him over to low dose Lantus therapy at 12 units daily in addition to
metformin 500 mg b.i.d. and coverage with low intensity Humalog supplemental
scale.  Blood glucose monitoring will be commenced q. 6 hours that is and
further therapeutic adjustments will be made accordingly.  Also, I will obtain a
hemoglobin A1c to better assess his overall state.
2.  Hypertension.  The patient's level of blood pressure control is adequate,
continue the current regimen.
3.  Hyperlipidemia.  The patient is maintained on atorvastatin therapy at
baseline.  This will be resumed when the patient is clinically stable.
 
I certainly appreciate this consultation by Dr. Lange.
 
 
 
 
 
 
 
 
 
 
 
 
 
 
 
 
 
 
 
 
  <ELECTRONICALLY SIGNED>
   By: Phong Simons MD         
  12/13/20     1123
D: 12/12/20 1006                           _____________________________________
T: 12/12/20 2059                           Phong Simons MD           /nt

## 2020-12-13 NOTE — HC
[facility]
[address]
[place]   [address]                     CONSULTATION                  
_______________________________________________________________________________
 
Name:       VICKI PADRON               Room #:         238-P       ADM IN  
M.R.#:      [id number]                       Account #:      [id number]  
Admission:  12/11/20    Attend Phys:    Rickie Lange MD 
Discharge:              Date of Birth:  [birth year]  
                                                          Report #: [id number]
                                                                    [id number]   
_______________________________________________________________________________
THIS REPORT FOR:  
 
cc:  Lillig,Mary Ann DO Lillig,Vamsi Pyle MD                                          ~
 
DATE OF SERVICE:  12/12/2020
 
 
WOUND CARE CONSULTATION
 
REASON FOR CONSULTATION:  Wound care for sacral stage 4 pressure ulcer in a
patient admitted for sepsis from right lower lobe pneumonia.
 
HISTORY OF PRESENT ILLNESS:  The patient is a 70-year-old gentleman with
immobility, status post cerebrovascular accident, who is well known to myself
and the wound care team from care at [facility].  He is
admitted for sepsis secondary to right lower lobe pneumonia.  He is relatively
immobile.  The patient previously developed a sacral stage 4 pressure ulcer,
which has been surgically debrided and he has a diverting colostomy.  We are
consulted for care of his wounds while he is hospitalized in the ICU for his
pneumonia.
 
PAST MEDICAL HISTORY:  Cerebrovascular accident with immobility, coronary artery
disease, ischemic cardiomyopathy, diabetes mellitus type 2, hypertension.
 
PAST SURGICAL HISTORY:  Tracheostomy, PEG tube, sacral wound debridement and
colostomy.
 
ALLERGIES:  No known allergies.
 
MEDICATIONS:  See chart.
 
LABORATORY DATA:  Albumin 1.8 with severe protein-calorie malnutrition.
 
PHYSICAL EXAMINATION:
GENERAL:  Shows a thin, chronically ill-appearing elderly gentleman with
tracheostomy.
HEENT:  Mucous membranes are moist.
LUNGS:  Respirations unlabored.
ABDOMEN:  Soft and scaphoid with a colostomy and a PEG tube.
EXTREMITIES:  Examination of the patient's back shows a large chronic 10 x 10 cm
sacral stage 4 pressure ulcer with exposed bone at the base.  Wound is chronic
appearing and well granulated with pink, healthy tissue.  Wound was packed with
quarter quarter-strength Dakin's gauze.  Colostomy diversion was complete.
 
 
 
 
[address] [place]   [address]                     CONSULTATION                  
_______________________________________________________________________________
 
Name:       VICKI PADRON               Room #:         238-P       [facility] [place]  
.R.#:      [id number]                       Account #:      [id number]  
Admission:  12/11/20    Attend Phys:    Rickie Lange MD 
Discharge:              Date of Birth:  [birth year]  
                                                          Report #: [id number]
                                                                    [id number]   
_______________________________________________________________________________
 
IMPRESSION:
1.  Immobility, status post cerebrovascular accident.
2.  Diabetes mellitus type 2 with skin ulcer.
3.  Respiratory failure with right lower lobe pneumonia and sepsis.
4.  Tracheostomy status.
5.  Colostomy status.
6.  Sacral stage 4 pressure ulcer, status post debridement.
 
PLAN:  Local wound care with quarter strength Dakin's packing, Kerlix gauze
twice daily.  Offload with low air loss mattress.  Wound care team will follow.
 
 
 
 
 
 
 
 
 
 
 
 
 
 
 
 
 
 
 
 
 
 
 
 
 
 
 
 
 
 
 
 
 
  <ELECTRONICALLY SIGNED>
   By: Vamsi Lucas MD          
  12/13/20     1213
D: 12/12/20 0637                           _____________________________________
T: 12/12/20 0644                           Vamsi Lucas MD            /nt

## 2020-12-14 VITALS — SYSTOLIC BLOOD PRESSURE: 138 MMHG | DIASTOLIC BLOOD PRESSURE: 68 MMHG

## 2020-12-14 VITALS — DIASTOLIC BLOOD PRESSURE: 67 MMHG | SYSTOLIC BLOOD PRESSURE: 139 MMHG

## 2020-12-14 VITALS — SYSTOLIC BLOOD PRESSURE: 136 MMHG | DIASTOLIC BLOOD PRESSURE: 72 MMHG

## 2020-12-14 VITALS — DIASTOLIC BLOOD PRESSURE: 63 MMHG | SYSTOLIC BLOOD PRESSURE: 133 MMHG

## 2020-12-14 VITALS — DIASTOLIC BLOOD PRESSURE: 66 MMHG | SYSTOLIC BLOOD PRESSURE: 146 MMHG

## 2020-12-14 VITALS — SYSTOLIC BLOOD PRESSURE: 135 MMHG | DIASTOLIC BLOOD PRESSURE: 75 MMHG

## 2020-12-14 VITALS — SYSTOLIC BLOOD PRESSURE: 138 MMHG | DIASTOLIC BLOOD PRESSURE: 70 MMHG

## 2020-12-14 VITALS — SYSTOLIC BLOOD PRESSURE: 139 MMHG | DIASTOLIC BLOOD PRESSURE: 70 MMHG

## 2020-12-14 VITALS — SYSTOLIC BLOOD PRESSURE: 141 MMHG | DIASTOLIC BLOOD PRESSURE: 71 MMHG

## 2020-12-14 VITALS — DIASTOLIC BLOOD PRESSURE: 71 MMHG | SYSTOLIC BLOOD PRESSURE: 141 MMHG

## 2020-12-14 VITALS — DIASTOLIC BLOOD PRESSURE: 73 MMHG | SYSTOLIC BLOOD PRESSURE: 142 MMHG

## 2020-12-14 VITALS — DIASTOLIC BLOOD PRESSURE: 70 MMHG | SYSTOLIC BLOOD PRESSURE: 141 MMHG

## 2020-12-14 VITALS — SYSTOLIC BLOOD PRESSURE: 142 MMHG | DIASTOLIC BLOOD PRESSURE: 75 MMHG

## 2020-12-14 VITALS — SYSTOLIC BLOOD PRESSURE: 130 MMHG | DIASTOLIC BLOOD PRESSURE: 61 MMHG

## 2020-12-14 VITALS — SYSTOLIC BLOOD PRESSURE: 135 MMHG | DIASTOLIC BLOOD PRESSURE: 71 MMHG

## 2020-12-14 VITALS — SYSTOLIC BLOOD PRESSURE: 138 MMHG | DIASTOLIC BLOOD PRESSURE: 72 MMHG

## 2020-12-14 VITALS — SYSTOLIC BLOOD PRESSURE: 130 MMHG | DIASTOLIC BLOOD PRESSURE: 68 MMHG

## 2020-12-14 VITALS — SYSTOLIC BLOOD PRESSURE: 141 MMHG | DIASTOLIC BLOOD PRESSURE: 68 MMHG

## 2020-12-14 VITALS — DIASTOLIC BLOOD PRESSURE: 71 MMHG | SYSTOLIC BLOOD PRESSURE: 139 MMHG

## 2020-12-14 VITALS — DIASTOLIC BLOOD PRESSURE: 72 MMHG | SYSTOLIC BLOOD PRESSURE: 143 MMHG

## 2020-12-14 VITALS — SYSTOLIC BLOOD PRESSURE: 126 MMHG | DIASTOLIC BLOOD PRESSURE: 72 MMHG

## 2020-12-14 VITALS — SYSTOLIC BLOOD PRESSURE: 127 MMHG | DIASTOLIC BLOOD PRESSURE: 62 MMHG

## 2020-12-14 VITALS — DIASTOLIC BLOOD PRESSURE: 52 MMHG | SYSTOLIC BLOOD PRESSURE: 139 MMHG

## 2020-12-14 VITALS — DIASTOLIC BLOOD PRESSURE: 80 MMHG | SYSTOLIC BLOOD PRESSURE: 150 MMHG

## 2020-12-14 VITALS — SYSTOLIC BLOOD PRESSURE: 140 MMHG | DIASTOLIC BLOOD PRESSURE: 69 MMHG

## 2020-12-14 VITALS — SYSTOLIC BLOOD PRESSURE: 153 MMHG | DIASTOLIC BLOOD PRESSURE: 73 MMHG

## 2020-12-14 VITALS — DIASTOLIC BLOOD PRESSURE: 66 MMHG | SYSTOLIC BLOOD PRESSURE: 134 MMHG

## 2020-12-14 VITALS — SYSTOLIC BLOOD PRESSURE: 126 MMHG | DIASTOLIC BLOOD PRESSURE: 63 MMHG

## 2020-12-14 VITALS — SYSTOLIC BLOOD PRESSURE: 132 MMHG | DIASTOLIC BLOOD PRESSURE: 70 MMHG

## 2020-12-14 VITALS — DIASTOLIC BLOOD PRESSURE: 69 MMHG | SYSTOLIC BLOOD PRESSURE: 144 MMHG

## 2020-12-14 VITALS — DIASTOLIC BLOOD PRESSURE: 70 MMHG | SYSTOLIC BLOOD PRESSURE: 148 MMHG

## 2020-12-14 VITALS — SYSTOLIC BLOOD PRESSURE: 141 MMHG | DIASTOLIC BLOOD PRESSURE: 73 MMHG

## 2020-12-14 VITALS — SYSTOLIC BLOOD PRESSURE: 141 MMHG | DIASTOLIC BLOOD PRESSURE: 75 MMHG

## 2020-12-14 VITALS — SYSTOLIC BLOOD PRESSURE: 133 MMHG | DIASTOLIC BLOOD PRESSURE: 71 MMHG

## 2020-12-14 VITALS — DIASTOLIC BLOOD PRESSURE: 71 MMHG | SYSTOLIC BLOOD PRESSURE: 138 MMHG

## 2020-12-14 VITALS — SYSTOLIC BLOOD PRESSURE: 140 MMHG | DIASTOLIC BLOOD PRESSURE: 66 MMHG

## 2020-12-14 VITALS — DIASTOLIC BLOOD PRESSURE: 71 MMHG | SYSTOLIC BLOOD PRESSURE: 137 MMHG

## 2020-12-14 VITALS — SYSTOLIC BLOOD PRESSURE: 147 MMHG | DIASTOLIC BLOOD PRESSURE: 83 MMHG

## 2020-12-14 VITALS — DIASTOLIC BLOOD PRESSURE: 66 MMHG | SYSTOLIC BLOOD PRESSURE: 130 MMHG

## 2020-12-14 LAB
ANION GAP SERPL CALC-SCNC: 2 MMOL/L (ref 7–16)
BUN SERPL-MCNC: 19 MG/DL (ref 7–18)
CALCIUM SERPL-MCNC: 8.2 MG/DL (ref 8.5–10.1)
CHLORIDE SERPL-SCNC: 102 MMOL/L (ref 98–107)
CO2 SERPL-SCNC: 38 MMOL/L (ref 21–32)
CREAT SERPL-MCNC: 0.2 MG/DL (ref 0.7–1.3)
GLUCOSE SERPL-MCNC: 118 MG/DL (ref 74–106)
MAGNESIUM SERPL-MCNC: 2.4 MG/DL (ref 1.8–2.4)
POTASSIUM SERPL-SCNC: 4 MMOL/L (ref 3.5–5.1)
SODIUM SERPL-SCNC: 142 MMOL/L (ref 136–145)

## 2020-12-14 NOTE — NUR
transfer
 
pt trasfeed from icu bed 238 to ccu bed 209 via bed. trach intact  w/
trach shield at 3%. telemetry in place, peg tube and supra pubic cath in
place. vss. pt awake and alert but unable to respond  d/t aphasia from pevious
cva.

## 2020-12-14 NOTE — NUR
FAXED CLINICAL UPDATE TO [facility] SPOKE WITH MELE IN ADM SHE RECEIVED UPDATE. DP
TO FOLLOW.

## 2020-12-14 NOTE — NUR
VSS REMAINS TRACHED WITH TRACH SHIELD. O2 SAT 99 %  FOLLOWS NO COMMANDS.
SINUS RHYTHM. AFEBRILE. 2000 CC UO THIS SHIFT. SUCTIONED FOR A MOD AMT
THICK BEIGE SECRETIONS VIA TRACH. 300 CC BROWN LIQ STOOL FROM COLOSTOMY.
GLUCERNA TF AT 70 CC/HR VIA PEG  GOAL RATE.  WILL CONT TO MONITOR

## 2020-12-15 VITALS — SYSTOLIC BLOOD PRESSURE: 151 MMHG | DIASTOLIC BLOOD PRESSURE: 79 MMHG

## 2020-12-15 VITALS — DIASTOLIC BLOOD PRESSURE: 65 MMHG | SYSTOLIC BLOOD PRESSURE: 145 MMHG

## 2020-12-15 VITALS — SYSTOLIC BLOOD PRESSURE: 140 MMHG | DIASTOLIC BLOOD PRESSURE: 63 MMHG

## 2020-12-15 LAB
ANION GAP SERPL CALC-SCNC: 7 MMOL/L (ref 7–16)
BUN SERPL-MCNC: 19 MG/DL (ref 7–18)
CALCIUM SERPL-MCNC: 9 MG/DL (ref 8.5–10.1)
CHLORIDE SERPL-SCNC: 108 MMOL/L (ref 98–107)
CO2 SERPL-SCNC: 27 MMOL/L (ref 21–32)
CREAT SERPL-MCNC: 0.7 MG/DL (ref 0.7–1.3)
ERYTHROCYTE [DISTWIDTH] IN BLOOD BY AUTOMATED COUNT: (no result) % (ref 10.5–14.5)
ERYTHROCYTE [DISTWIDTH] IN BLOOD BY AUTOMATED COUNT: 17.7 % (ref 10.5–14.5)
GLUCOSE SERPL-MCNC: 154 MG/DL (ref 74–106)
HCT VFR BLD CALC: (no result) % (ref 42–52)
HCT VFR BLD CALC: 26.4 % (ref 42–52)
HGB BLD-MCNC: (no result) GM/DL (ref 14–18)
HGB BLD-MCNC: 8.6 GM/DL (ref 14–18)
MAGNESIUM SERPL-MCNC: 1.6 MG/DL (ref 1.8–2.4)
MCH RBC QN AUTO: (no result) PG (ref 26–34)
MCH RBC QN AUTO: 29.9 PG (ref 26–34)
MCHC RBC AUTO-ENTMCNC: (no result) G/DL (ref 28–37)
MCHC RBC AUTO-ENTMCNC: 32.6 G/DL (ref 28–37)
MCV RBC: (no result) FL (ref 80–100)
MCV RBC: 91.6 FL (ref 80–100)
PLATELET # BLD: (no result) THOU/UL (ref 150–400)
PLATELET # BLD: 431 THOU/UL (ref 150–400)
POTASSIUM SERPL-SCNC: 4.7 MMOL/L (ref 3.5–5.1)
RBC # BLD AUTO: (no result) MIL/UL (ref 4.5–6)
RBC # BLD AUTO: 2.88 MIL/UL (ref 4.5–6)
SODIUM SERPL-SCNC: 142 MMOL/L (ref 136–145)
WBC # BLD AUTO: (no result) THOU/UL (ref 4–11)
WBC # BLD AUTO: 8.7 THOU/UL (ref 4–11)

## 2020-12-15 NOTE — NUR
ASSUMED CARE OF PATIENT AT 1900; ORIENTED TO SELF, NONAMBULATORY, APHASIC WITH
PORFIRIO-PLEGIA/PARESIS; TRACH WITH T-TUBE WITH Fi02 AT 35-PERCENT/ 02 SAT
98-PERCENT AND ABOVE; Q2 TURNS WITH PRESSURE WOUNDS AND BID DRESSING CHANGES;
HEELS OFFLOADED WITH PRAFO BOOTS IN PLACE;  PEG TUBE WITH GLUCERNA 1.2 AT GOAL
OF 70 ML/H  ML Q6H FLUSHES;  COLOSTOMY BAG CHANGED 12/14/20 D/T
LEAKAGE; COLOSTOMY INTACT POST-BAG CHANGE WITH LIQUID STOOL; STOMA - RED,
WARM, AND MOIST; PLAN IS FOR PATIENT TO REMAIN FOR OBSERVATION AND CONSULT D/T
CHANGE IN PLATELET LAB RESULTS; WILL CONTINUE TO MONITOR.

## 2020-12-15 NOTE — NUR
PT DISCHARGING TODAY TO [facility] SKILLED STAY FAXED DC ORDERS/SUMMARY TO FACILITY
SPOKE WITH MELE IN ADM SHE RECEIVED ORDERS AND THAT THEY DO NOT HAVE
STRETCHER VAN TRANSPORT TODAY SO TRANSPORT SCHEDULED BY [facility] ([facility]) FOR
[id number]. NOTIFIED PT'S SON (VICKI) OF DC AND TIME OF TRANSPORT HE IS
AGREEABLE TO DISCHARGE. NOTIFIED UNIT AND CHART COPY  PER US. RN TO CALL
REPORT -[phone].

## 2020-12-15 NOTE — NUR
WOUND CARE F/U;
VAC DRESSING APPLICATION.  THE WOUND HAS NO ODOR AND SMALL TO MODERATE
DRAINAGE.  A SMALL AMOUNT OF NON VIABLE TISSUE IN THE WOUND BED.  MEASURMENTS
ARE THE SAMES AS PREVIOUS ASSEMMENT. PCA ASSISTED ME TODAY.
 
RECOMMENDATIONS; NO CHANGES, CONTINUE VAC THERAPY.
 
DISCUSSED WITH RN.

## 2020-12-15 NOTE — NUR
PT WITH CHRONIC TRACH, O2 SETTINGS UNCHANGED. COLOSTOMY IN PLACE WITH LIQUID
DRAINAGE, MARSH IN PLACE WITH ADEQUATE UOP, AFEBRILE. PRAFO BOOTS WITH SMALL
WOUNDS ON FEET. WOUND VAC TO BE TAKEN OFF BY NURSE AND REPLACED WITH A WET TO
DRY DRESSING. CENTRAL LINE IN PLACE. PEG TUBE IN PLACE, TOLERATING TUBE
FEEDING AT GOAL. PT TO BE TRANSFERED TO [facility] TODAY, REPORT
WAS GIVEN TO RN AT 1520. PT PROGRESSING TOWARDS POC.

## 2020-12-16 NOTE — PATH
[facility]
[address]
[place]   [address]                     PATHOLOGY RPT PROCEDURE       
_______________________________________________________________________________
 
Name:       VICKI PADRON               Room #:         209-P       DIS IN  
M.R.#:      [id number]     Account #:      [id number]  
Admission:  12/11/20    Date of Birth:  [birth year]  
Discharge:  12/15/20                                    Report #:    [id number]
                                                        Path Case #: [id number]
_______________________________________________________________________________
Note
[facility] Accession Number: [id number]
   TESTS               RESULT  FLAG  UNITS    REF RANGE  LAB
------------------------------------------------------------
   Clinician Provided Cytology Information
   No. of containers..01 Other (Miscellaneous)
Source:                                                   01
   PLEURAL FLUID
DIAGNOSIS:                                                02
   PLEURAL FLUID
   NEGATIVE FOR MALIGNANT EPITHELIAL CELLS.
   REACTIVE MESOTHELIAL CELLS ARE PRESENT.
   THIS INTERPRETATION INCLUDES EVALUATION OF A CELL BLOCK.
   MILD CHRONIC INFLAMMATION.
Pathologist ICD10:                                        02
   A41.9
Signed out by:                                            02
   Dayana Steele MD, Pathologist
   NPI- [id number]
Performed by:                                             Paula Dave, Cytotechnologist ([facility])
Gross description:                                        01
   2.5ML, YELLOW, 1TP 1CB
   /LCS  12/14/2020  0741 Local
 
------------------------------------------------------------
    FLAG LEGEND:
    L-Low Normal,H-High Normal,LL-Alert Low,HH-Alert High
    <-Panic Low,>-Panic High,A-Abnormal,AA-Critical Abnormal
------------------------------------------------------------
 
Performed at:
[address]
   [place]  [address]
   Dez Cisneros MD, Phone: [phone]
[address]  [address]
   Dayana Steele MD, Phone: [phone]
Specimen Comment: A courtesy copy of this report has been sent to [phone]
[phone]
Specimen Comment: [phone], [phone]
Specimen Comment: Report sent to DR BOYLE,DR LILLIG / DR CHUA
Specimen Comment: A duplicate report has been generated due to demographic
updates.
***Performed at:  01
   [address], [place], [place]  [id number]
 
 
[address]   [address]                     PATHOLOGY RPT PROCEDURE       
_______________________________________________________________________________
 
Name:       VICKI PADRON               Room #:         209-P       [facility] IN  
M.R.#:      [id number]     Account #:      [id number]  
Admission:  12/11/20    Date of Birth:  [birth year]  
Discharge:  12/15/20                                    Report #:    [id number]
                                                        Path Case #: [id number]
_______________________________________________________________________________
   MD Dez [facility] MD Phone:  [phone]

## 2020-12-17 NOTE — EKG
[facility]
[address]
[place]   [address]                     ELECTROCARDIOGRAM REPORT      
_______________________________________________________________________________
 
Name:       VICKI PADRON               Room #:         209-P       [facility] IN  
M.R.#:      [id number]                       Account #:      [id number]  
Admission:  12/11/20    Attend Phys:    Rickie Lange MD 
Discharge:  12/15/20    Date of Birth:  [birth year]  
                                                          Report #: [id number]
                                                                    [id number]
_______________________________________________________________________________
THIS REPORT FOR:  
 
cc:  Lillig,Mary Ann DO Lillig,Antwan Luciano MD                                            ~
 
 
 
 
 
 
 
 
 
 
 
 
 
 
 
 
 
 
 
 
 
 
 
 
 
 
 
 
 
 
 
 
 
 
 
 
 
 
 
  <ELECTRONICALLY SIGNED>
   By: Antwan Marx MD        
  12/11/20     1047
D: 12/11/20 0740                           _____________________________________
T: 12/11/20 0740                           Antwan Marx MD          /EPI

## 2021-01-01 ENCOUNTER — HOSPITAL ENCOUNTER (EMERGENCY)
Dept: HOSPITAL 35 - ER | Age: 71
LOS: 1 days | Discharge: HOME | End: 2021-01-02
Payer: COMMERCIAL

## 2021-01-01 VITALS — BODY MASS INDEX: 20.62 KG/M2 | WEIGHT: 131.37 LBS | HEIGHT: 67 IN

## 2021-01-01 DIAGNOSIS — Z79.899: ICD-10-CM

## 2021-01-01 DIAGNOSIS — E11.9: ICD-10-CM

## 2021-01-01 DIAGNOSIS — J80: Primary | ICD-10-CM

## 2021-01-01 DIAGNOSIS — Z79.4: ICD-10-CM

## 2021-01-01 DIAGNOSIS — Z20.828: ICD-10-CM

## 2021-01-01 DIAGNOSIS — E78.5: ICD-10-CM

## 2021-01-01 DIAGNOSIS — I10: ICD-10-CM

## 2021-01-01 LAB
ALBUMIN SERPL-MCNC: 1.6 G/DL (ref 3.4–5)
ALT SERPL-CCNC: 25 U/L (ref 30–65)
ANION GAP SERPL CALC-SCNC: 8 MMOL/L (ref 7–16)
AST SERPL-CCNC: 18 U/L (ref 15–37)
BASOPHILS NFR BLD AUTO: 0.4 % (ref 0–2)
BILIRUB SERPL-MCNC: 0.3 MG/DL (ref 0.2–1)
BUN SERPL-MCNC: 63 MG/DL (ref 7–18)
CALCIUM SERPL-MCNC: 9.7 MG/DL (ref 8.5–10.1)
CHLORIDE SERPL-SCNC: 102 MMOL/L (ref 98–107)
CO2 SERPL-SCNC: 29 MMOL/L (ref 21–32)
CREAT SERPL-MCNC: 1.5 MG/DL (ref 0.7–1.3)
EOSINOPHIL NFR BLD: 0.3 % (ref 0–3)
ERYTHROCYTE [DISTWIDTH] IN BLOOD BY AUTOMATED COUNT: 17.5 % (ref 10.5–14.5)
GLUCOSE SERPL-MCNC: 432 MG/DL (ref 74–106)
GRANULOCYTES NFR BLD MANUAL: 87.2 % (ref 36–66)
HCT VFR BLD CALC: 29.5 % (ref 42–52)
HGB BLD-MCNC: 9.4 GM/DL (ref 14–18)
LYMPHOCYTES NFR BLD AUTO: 7.7 % (ref 24–44)
MCH RBC QN AUTO: 29.4 PG (ref 26–34)
MCHC RBC AUTO-ENTMCNC: 32 G/DL (ref 28–37)
MCV RBC: 92 FL (ref 80–100)
MONOCYTES NFR BLD: 4.4 % (ref 1–8)
NEUTROPHILS # BLD: 8.4 THOU/UL (ref 1.4–8.2)
PLATELET # BLD: 328 THOU/UL (ref 150–400)
POTASSIUM SERPL-SCNC: 5.3 MMOL/L (ref 3.5–5.1)
PROT SERPL-MCNC: 7 G/DL (ref 6.4–8.2)
RBC # BLD AUTO: 3.2 MIL/UL (ref 4.5–6)
SODIUM SERPL-SCNC: 139 MMOL/L (ref 136–145)
WBC # BLD AUTO: 9.7 THOU/UL (ref 4–11)

## 2021-01-02 VITALS — DIASTOLIC BLOOD PRESSURE: 78 MMHG | SYSTOLIC BLOOD PRESSURE: 132 MMHG

## 2021-01-03 NOTE — EKG
[facility]
[address]
[place]  [address]
Phone:  [phone]                    ELECTROCARDIOGRAM REPORT      
_______________________________________________________________________________
 
Name:       VICKI PADRON               Room #:                     DEP CHET HERNANDEZ#:      [id number]     Account #:      [id number]  
Admission:  21    Attend Phys:                          
Discharge:  21    Date of Birth:  [birth year]  
                                                          Report #: [id number]
   [id number]
_______________________________________________________________________________
                         [facility] ED
                                       
Test Date:    2021               Test Time:    22:28:03
Pat Name:     VICKI PADRON          Department:   
Patient ID:   [id number]            Room:          
Gender:       M                        Technician:   BRENNAN
:          1950               Requested By: Dino Curiel
Order Number: [id number] MD:   Antwan Marx
                                 Measurements
Intervals                              Axis          
Rate:         90                       P:            0
FL:           148                      QRS:          -41
QRSD:         126                      T:            126
QT:           434                                    
QTc:          531                                    
                           Interpretive Statements
Sinus rhytm with V pacing.  
No further rhythm analysis attempted due to paced rhythm
Compared to ECG 2020 07:40:51
Electronically Signed On 1-3-2021 9:30:57 CST by Antwan Marx
[email or web address]
 
 
 
 
 
 
 
 
 
 
 
 
 
 
 
 
 
 
 
 
 
 
  <ELECTRONICALLY SIGNED>
   By: Antwan Marx MD        
  21
D: 218                           _____________________________________
T: 21                           Antwan Marx MD          /SHUBHAM

## 2021-02-10 ENCOUNTER — HOSPITAL ENCOUNTER (INPATIENT)
Dept: HOSPITAL 35 - ER | Age: 71
LOS: 6 days | Discharge: NURSING, REHAB OR LONG-TERM CARE | DRG: 871 | End: 2021-02-16
Attending: HOSPITALIST | Admitting: HOSPITALIST
Payer: COMMERCIAL

## 2021-02-10 VITALS — DIASTOLIC BLOOD PRESSURE: 67 MMHG | SYSTOLIC BLOOD PRESSURE: 123 MMHG

## 2021-02-10 VITALS — DIASTOLIC BLOOD PRESSURE: 71 MMHG | SYSTOLIC BLOOD PRESSURE: 131 MMHG

## 2021-02-10 VITALS — DIASTOLIC BLOOD PRESSURE: 77 MMHG | SYSTOLIC BLOOD PRESSURE: 146 MMHG

## 2021-02-10 VITALS — DIASTOLIC BLOOD PRESSURE: 64 MMHG | SYSTOLIC BLOOD PRESSURE: 110 MMHG

## 2021-02-10 VITALS — SYSTOLIC BLOOD PRESSURE: 106 MMHG | DIASTOLIC BLOOD PRESSURE: 56 MMHG

## 2021-02-10 VITALS
BODY MASS INDEX: 21.19 KG/M2 | DIASTOLIC BLOOD PRESSURE: 58 MMHG | SYSTOLIC BLOOD PRESSURE: 92 MMHG | WEIGHT: 148 LBS | HEIGHT: 70 IN

## 2021-02-10 VITALS — DIASTOLIC BLOOD PRESSURE: 78 MMHG | SYSTOLIC BLOOD PRESSURE: 132 MMHG

## 2021-02-10 VITALS — SYSTOLIC BLOOD PRESSURE: 105 MMHG | DIASTOLIC BLOOD PRESSURE: 58 MMHG

## 2021-02-10 VITALS — DIASTOLIC BLOOD PRESSURE: 80 MMHG | SYSTOLIC BLOOD PRESSURE: 145 MMHG

## 2021-02-10 VITALS — SYSTOLIC BLOOD PRESSURE: 137 MMHG | DIASTOLIC BLOOD PRESSURE: 82 MMHG

## 2021-02-10 VITALS — DIASTOLIC BLOOD PRESSURE: 73 MMHG | SYSTOLIC BLOOD PRESSURE: 131 MMHG

## 2021-02-10 VITALS — DIASTOLIC BLOOD PRESSURE: 55 MMHG | SYSTOLIC BLOOD PRESSURE: 93 MMHG

## 2021-02-10 VITALS — DIASTOLIC BLOOD PRESSURE: 78 MMHG | SYSTOLIC BLOOD PRESSURE: 141 MMHG

## 2021-02-10 VITALS — SYSTOLIC BLOOD PRESSURE: 120 MMHG | DIASTOLIC BLOOD PRESSURE: 72 MMHG

## 2021-02-10 VITALS — DIASTOLIC BLOOD PRESSURE: 76 MMHG | SYSTOLIC BLOOD PRESSURE: 140 MMHG

## 2021-02-10 VITALS — SYSTOLIC BLOOD PRESSURE: 104 MMHG | DIASTOLIC BLOOD PRESSURE: 58 MMHG

## 2021-02-10 VITALS — SYSTOLIC BLOOD PRESSURE: 149 MMHG | DIASTOLIC BLOOD PRESSURE: 89 MMHG

## 2021-02-10 VITALS — DIASTOLIC BLOOD PRESSURE: 76 MMHG | SYSTOLIC BLOOD PRESSURE: 126 MMHG

## 2021-02-10 VITALS — DIASTOLIC BLOOD PRESSURE: 56 MMHG | SYSTOLIC BLOOD PRESSURE: 96 MMHG

## 2021-02-10 VITALS — SYSTOLIC BLOOD PRESSURE: 113 MMHG | DIASTOLIC BLOOD PRESSURE: 60 MMHG

## 2021-02-10 VITALS — SYSTOLIC BLOOD PRESSURE: 150 MMHG | DIASTOLIC BLOOD PRESSURE: 80 MMHG

## 2021-02-10 VITALS — SYSTOLIC BLOOD PRESSURE: 133 MMHG | DIASTOLIC BLOOD PRESSURE: 83 MMHG

## 2021-02-10 VITALS — SYSTOLIC BLOOD PRESSURE: 132 MMHG | DIASTOLIC BLOOD PRESSURE: 71 MMHG

## 2021-02-10 VITALS — DIASTOLIC BLOOD PRESSURE: 78 MMHG | SYSTOLIC BLOOD PRESSURE: 139 MMHG

## 2021-02-10 VITALS — DIASTOLIC BLOOD PRESSURE: 72 MMHG | SYSTOLIC BLOOD PRESSURE: 133 MMHG

## 2021-02-10 VITALS — SYSTOLIC BLOOD PRESSURE: 127 MMHG | DIASTOLIC BLOOD PRESSURE: 68 MMHG

## 2021-02-10 VITALS — SYSTOLIC BLOOD PRESSURE: 134 MMHG | DIASTOLIC BLOOD PRESSURE: 77 MMHG

## 2021-02-10 VITALS — SYSTOLIC BLOOD PRESSURE: 108 MMHG | DIASTOLIC BLOOD PRESSURE: 62 MMHG

## 2021-02-10 VITALS — SYSTOLIC BLOOD PRESSURE: 121 MMHG | DIASTOLIC BLOOD PRESSURE: 57 MMHG

## 2021-02-10 DIAGNOSIS — E11.69: ICD-10-CM

## 2021-02-10 DIAGNOSIS — R65.20: ICD-10-CM

## 2021-02-10 DIAGNOSIS — G82.50: ICD-10-CM

## 2021-02-10 DIAGNOSIS — Z86.73: ICD-10-CM

## 2021-02-10 DIAGNOSIS — E43: ICD-10-CM

## 2021-02-10 DIAGNOSIS — L89.154: ICD-10-CM

## 2021-02-10 DIAGNOSIS — L89.893: ICD-10-CM

## 2021-02-10 DIAGNOSIS — Z95.0: ICD-10-CM

## 2021-02-10 DIAGNOSIS — M46.28: ICD-10-CM

## 2021-02-10 DIAGNOSIS — I25.10: ICD-10-CM

## 2021-02-10 DIAGNOSIS — A41.9: Primary | ICD-10-CM

## 2021-02-10 DIAGNOSIS — K81.0: ICD-10-CM

## 2021-02-10 DIAGNOSIS — D64.9: ICD-10-CM

## 2021-02-10 DIAGNOSIS — N17.9: ICD-10-CM

## 2021-02-10 DIAGNOSIS — G93.41: ICD-10-CM

## 2021-02-10 DIAGNOSIS — N39.0: ICD-10-CM

## 2021-02-10 DIAGNOSIS — J96.20: ICD-10-CM

## 2021-02-10 DIAGNOSIS — L89.523: ICD-10-CM

## 2021-02-10 DIAGNOSIS — Z20.822: ICD-10-CM

## 2021-02-10 LAB
ALBUMIN SERPL-MCNC: 1.7 G/DL (ref 3.4–5)
ALT SERPL-CCNC: 136 U/L (ref 16–63)
ANION GAP SERPL CALC-SCNC: 11 MMOL/L (ref 7–16)
AST SERPL-CCNC: 105 U/L (ref 15–37)
BASOPHILS NFR BLD AUTO: 0 % (ref 0–2)
BE(VIVO): -1 MMOL/L
BILIRUB SERPL-MCNC: 0.5 MG/DL (ref 0.2–1)
BILIRUB UR-MCNC: NEGATIVE MG/DL
BUN SERPL-MCNC: 101 MG/DL (ref 7–18)
CALCIUM SERPL-MCNC: 9.4 MG/DL (ref 8.5–10.1)
CHLORIDE SERPL-SCNC: 102 MMOL/L (ref 98–107)
CO2 SERPL-SCNC: 27 MMOL/L (ref 21–32)
COLOR UR: YELLOW
CREAT SERPL-MCNC: 2.2 MG/DL (ref 0.7–1.3)
EOSINOPHIL NFR BLD: 1 % (ref 0–3)
ERYTHROCYTE [DISTWIDTH] IN BLOOD BY AUTOMATED COUNT: 18.5 % (ref 10.5–14.5)
GLUCOSE SERPL-MCNC: 254 MG/DL (ref 74–106)
GRANULOCYTES NFR BLD MANUAL: 80 % (ref 36–66)
HCO3 BLD-SCNC: 22.3 MMOL/L (ref 22–26)
HCT VFR BLD CALC: 27.3 % (ref 42–52)
HGB BLD-MCNC: 8.8 GM/DL (ref 14–18)
INR PPP: 1.1
KETONES UR STRIP-MCNC: NEGATIVE MG/DL
LYMPHOCYTES NFR BLD AUTO: 9 % (ref 24–44)
MCH RBC QN AUTO: 30.1 PG (ref 26–34)
MCHC RBC AUTO-ENTMCNC: 32.3 G/DL (ref 28–37)
MCV RBC: 93.2 FL (ref 80–100)
MONOCYTES NFR BLD: 1 % (ref 1–8)
NEUTROPHILS # BLD: 4.5 THOU/UL (ref 1.4–8.2)
NEUTS BAND NFR BLD: 9 % (ref 0–8)
PCO2 BLD: 31.3 MMHG (ref 35–45)
PLATELET # BLD EST: NORMAL 10*3/UL
PLATELET # BLD: 303 THOU/UL (ref 150–400)
PO2 BLD: 94.1 MMHG (ref 80–100)
POTASSIUM SERPL-SCNC: 5.2 MMOL/L (ref 3.5–5.1)
PROT SERPL-MCNC: 7.3 G/DL (ref 6.4–8.2)
PROTHROMBIN TIME: 11.4 SECONDS (ref 9.3–11.4)
RBC # BLD AUTO: 2.93 MIL/UL (ref 4.5–6)
RBC # UR STRIP: (no result) /UL
RBC #/AREA URNS HPF: (no result) /HPF (ref 0–2)
RBC MORPH BLD: NORMAL
SODIUM SERPL-SCNC: 140 MMOL/L (ref 136–145)
SP GR UR STRIP: 1.02 (ref 1–1.03)
TROPONIN I SERPL-MCNC: <0.06 NG/ML (ref ?–0.06)
URINE CLARITY: (no result)
URINE GLUCOSE-RANDOM*: NEGATIVE
URINE LEUKOCYTES-REFLEX: (no result)
URINE NITRITE-REFLEX: NEGATIVE
URINE PROTEIN (DIPSTICK): (no result)
URINE WBC-REFLEX: (no result) /HPF (ref 0–5)
UROBILINOGEN UR STRIP-ACNC: 0.2 E.U./DL (ref 0.2–1)
WBC # BLD AUTO: 5.1 THOU/UL (ref 4–11)

## 2021-02-10 PROCEDURE — 10195: CPT

## 2021-02-10 PROCEDURE — 10081 I&D PILONIDAL CYST COMP: CPT

## 2021-02-10 PROCEDURE — 10194: CPT

## 2021-02-10 PROCEDURE — 10078: CPT

## 2021-02-10 NOTE — EKG
[address] [facility]
[place]  [address]
Phone:  [phone]                    ELECTROCARDIOGRAM REPORT      
_______________________________________________________________________________
 
Name:       VICKI PADRON               Room #:         206-P       ADM IN  
M.R.#:      [id number]     Account #:      [id number]  
Admission:  02/10/21    Attend Phys:    Jack Lerma MD     
Discharge:              Date of Birth:  [birth year]  
                                                          Report #: [id number]
   [id number]
_______________________________________________________________________________
                         [facility] ED
                                       
Test Date:    2021-02-10               Test Time:    10:43:49
Pat Name:     VICKI PADRON          Department:   
Patient ID:   [id number]            Room:         170
Gender:       M                        Technician:   
:          1950               Requested By: Dino Curiel
Order Number: [id number] MD:   Wenceslao Houser
                                 Measurements
Intervals                              Axis          
Rate:         146                      P:            
AK:                                    QRS:          -34
QRSD:         121                      T:            128
QT:           328                                    
QTc:          512                                    
                           Interpretive Statements
Suspect Atrial FLutter 2:1 block
Left bundle branch block
No previous ECG available for comparison
Electronically Signed On 2- 12:42:13 CST by Wenceslao Houser
[email or web address]
 
 
 
 
 
 
 
 
 
 
 
 
 
 
 
 
 
 
 
 
 
 
  <ELECTRONICALLY SIGNED>
   By: Wenceslao Houser MD, [facility]    
  02/10/21     1242
D: 02/10/21 1043                           _____________________________________
T: 02/10/21 1043                           Wenceslao Houser MD, FACC      /EPI

## 2021-02-10 NOTE — NUR
0910- Patient arrived to unit, pulled over to ICU bed, bath given, monitor
devices placed. Patient was diaphoretic with a high fever. After cool bath
given, arvind packs placed. This was all informed to Dr. Gongora when notified of
the consult.

## 2021-02-11 VITALS — SYSTOLIC BLOOD PRESSURE: 141 MMHG | DIASTOLIC BLOOD PRESSURE: 82 MMHG

## 2021-02-11 VITALS — DIASTOLIC BLOOD PRESSURE: 76 MMHG | SYSTOLIC BLOOD PRESSURE: 113 MMHG

## 2021-02-11 VITALS — DIASTOLIC BLOOD PRESSURE: 67 MMHG | SYSTOLIC BLOOD PRESSURE: 107 MMHG

## 2021-02-11 VITALS — DIASTOLIC BLOOD PRESSURE: 78 MMHG | SYSTOLIC BLOOD PRESSURE: 137 MMHG

## 2021-02-11 VITALS — SYSTOLIC BLOOD PRESSURE: 121 MMHG | DIASTOLIC BLOOD PRESSURE: 70 MMHG

## 2021-02-11 VITALS — SYSTOLIC BLOOD PRESSURE: 134 MMHG | DIASTOLIC BLOOD PRESSURE: 72 MMHG

## 2021-02-11 VITALS — SYSTOLIC BLOOD PRESSURE: 133 MMHG | DIASTOLIC BLOOD PRESSURE: 64 MMHG

## 2021-02-11 VITALS — DIASTOLIC BLOOD PRESSURE: 82 MMHG | SYSTOLIC BLOOD PRESSURE: 140 MMHG

## 2021-02-11 VITALS — DIASTOLIC BLOOD PRESSURE: 63 MMHG | SYSTOLIC BLOOD PRESSURE: 109 MMHG

## 2021-02-11 VITALS — DIASTOLIC BLOOD PRESSURE: 74 MMHG | SYSTOLIC BLOOD PRESSURE: 123 MMHG

## 2021-02-11 VITALS — SYSTOLIC BLOOD PRESSURE: 131 MMHG | DIASTOLIC BLOOD PRESSURE: 62 MMHG

## 2021-02-11 VITALS — SYSTOLIC BLOOD PRESSURE: 110 MMHG | DIASTOLIC BLOOD PRESSURE: 70 MMHG

## 2021-02-11 VITALS — SYSTOLIC BLOOD PRESSURE: 108 MMHG | DIASTOLIC BLOOD PRESSURE: 73 MMHG

## 2021-02-11 VITALS — SYSTOLIC BLOOD PRESSURE: 120 MMHG | DIASTOLIC BLOOD PRESSURE: 73 MMHG

## 2021-02-11 VITALS — SYSTOLIC BLOOD PRESSURE: 139 MMHG | DIASTOLIC BLOOD PRESSURE: 65 MMHG

## 2021-02-11 VITALS — SYSTOLIC BLOOD PRESSURE: 108 MMHG | DIASTOLIC BLOOD PRESSURE: 60 MMHG

## 2021-02-11 VITALS — SYSTOLIC BLOOD PRESSURE: 118 MMHG | DIASTOLIC BLOOD PRESSURE: 70 MMHG

## 2021-02-11 VITALS — SYSTOLIC BLOOD PRESSURE: 146 MMHG | DIASTOLIC BLOOD PRESSURE: 84 MMHG

## 2021-02-11 VITALS — DIASTOLIC BLOOD PRESSURE: 90 MMHG | SYSTOLIC BLOOD PRESSURE: 145 MMHG

## 2021-02-11 VITALS — SYSTOLIC BLOOD PRESSURE: 106 MMHG | DIASTOLIC BLOOD PRESSURE: 80 MMHG

## 2021-02-11 VITALS — DIASTOLIC BLOOD PRESSURE: 68 MMHG | SYSTOLIC BLOOD PRESSURE: 115 MMHG

## 2021-02-11 VITALS — DIASTOLIC BLOOD PRESSURE: 74 MMHG | SYSTOLIC BLOOD PRESSURE: 135 MMHG

## 2021-02-11 VITALS — SYSTOLIC BLOOD PRESSURE: 118 MMHG | DIASTOLIC BLOOD PRESSURE: 68 MMHG

## 2021-02-11 VITALS — DIASTOLIC BLOOD PRESSURE: 61 MMHG | SYSTOLIC BLOOD PRESSURE: 103 MMHG

## 2021-02-11 VITALS — DIASTOLIC BLOOD PRESSURE: 76 MMHG | SYSTOLIC BLOOD PRESSURE: 133 MMHG

## 2021-02-11 VITALS — DIASTOLIC BLOOD PRESSURE: 67 MMHG | SYSTOLIC BLOOD PRESSURE: 113 MMHG

## 2021-02-11 VITALS — SYSTOLIC BLOOD PRESSURE: 120 MMHG | DIASTOLIC BLOOD PRESSURE: 61 MMHG

## 2021-02-11 VITALS — DIASTOLIC BLOOD PRESSURE: 74 MMHG | SYSTOLIC BLOOD PRESSURE: 146 MMHG

## 2021-02-11 VITALS — SYSTOLIC BLOOD PRESSURE: 121 MMHG | DIASTOLIC BLOOD PRESSURE: 68 MMHG

## 2021-02-11 VITALS — DIASTOLIC BLOOD PRESSURE: 69 MMHG | SYSTOLIC BLOOD PRESSURE: 114 MMHG

## 2021-02-11 VITALS — DIASTOLIC BLOOD PRESSURE: 62 MMHG | SYSTOLIC BLOOD PRESSURE: 110 MMHG

## 2021-02-11 VITALS — DIASTOLIC BLOOD PRESSURE: 82 MMHG | SYSTOLIC BLOOD PRESSURE: 143 MMHG

## 2021-02-11 VITALS — DIASTOLIC BLOOD PRESSURE: 91 MMHG | SYSTOLIC BLOOD PRESSURE: 130 MMHG

## 2021-02-11 VITALS — SYSTOLIC BLOOD PRESSURE: 126 MMHG | DIASTOLIC BLOOD PRESSURE: 74 MMHG

## 2021-02-11 VITALS — DIASTOLIC BLOOD PRESSURE: 68 MMHG | SYSTOLIC BLOOD PRESSURE: 121 MMHG

## 2021-02-11 VITALS — SYSTOLIC BLOOD PRESSURE: 126 MMHG | DIASTOLIC BLOOD PRESSURE: 75 MMHG

## 2021-02-11 VITALS — SYSTOLIC BLOOD PRESSURE: 142 MMHG | DIASTOLIC BLOOD PRESSURE: 74 MMHG

## 2021-02-11 VITALS — SYSTOLIC BLOOD PRESSURE: 152 MMHG | DIASTOLIC BLOOD PRESSURE: 79 MMHG

## 2021-02-11 VITALS — SYSTOLIC BLOOD PRESSURE: 131 MMHG | DIASTOLIC BLOOD PRESSURE: 74 MMHG

## 2021-02-11 VITALS — SYSTOLIC BLOOD PRESSURE: 125 MMHG | DIASTOLIC BLOOD PRESSURE: 74 MMHG

## 2021-02-11 VITALS — SYSTOLIC BLOOD PRESSURE: 144 MMHG | DIASTOLIC BLOOD PRESSURE: 81 MMHG

## 2021-02-11 VITALS — DIASTOLIC BLOOD PRESSURE: 69 MMHG | SYSTOLIC BLOOD PRESSURE: 115 MMHG

## 2021-02-11 VITALS — SYSTOLIC BLOOD PRESSURE: 114 MMHG | DIASTOLIC BLOOD PRESSURE: 67 MMHG

## 2021-02-11 VITALS — SYSTOLIC BLOOD PRESSURE: 119 MMHG | DIASTOLIC BLOOD PRESSURE: 66 MMHG

## 2021-02-11 VITALS — SYSTOLIC BLOOD PRESSURE: 142 MMHG | DIASTOLIC BLOOD PRESSURE: 77 MMHG

## 2021-02-11 VITALS — DIASTOLIC BLOOD PRESSURE: 71 MMHG | SYSTOLIC BLOOD PRESSURE: 114 MMHG

## 2021-02-11 VITALS — DIASTOLIC BLOOD PRESSURE: 70 MMHG | SYSTOLIC BLOOD PRESSURE: 104 MMHG

## 2021-02-11 VITALS — DIASTOLIC BLOOD PRESSURE: 73 MMHG | SYSTOLIC BLOOD PRESSURE: 121 MMHG

## 2021-02-11 VITALS — SYSTOLIC BLOOD PRESSURE: 138 MMHG | DIASTOLIC BLOOD PRESSURE: 88 MMHG

## 2021-02-11 VITALS — DIASTOLIC BLOOD PRESSURE: 66 MMHG | SYSTOLIC BLOOD PRESSURE: 109 MMHG

## 2021-02-11 VITALS — SYSTOLIC BLOOD PRESSURE: 117 MMHG | DIASTOLIC BLOOD PRESSURE: 70 MMHG

## 2021-02-11 VITALS — SYSTOLIC BLOOD PRESSURE: 115 MMHG | DIASTOLIC BLOOD PRESSURE: 71 MMHG

## 2021-02-11 VITALS — SYSTOLIC BLOOD PRESSURE: 115 MMHG | DIASTOLIC BLOOD PRESSURE: 64 MMHG

## 2021-02-11 VITALS — DIASTOLIC BLOOD PRESSURE: 78 MMHG | SYSTOLIC BLOOD PRESSURE: 124 MMHG

## 2021-02-11 VITALS — SYSTOLIC BLOOD PRESSURE: 145 MMHG | DIASTOLIC BLOOD PRESSURE: 82 MMHG

## 2021-02-11 VITALS — SYSTOLIC BLOOD PRESSURE: 114 MMHG | DIASTOLIC BLOOD PRESSURE: 66 MMHG

## 2021-02-11 VITALS — SYSTOLIC BLOOD PRESSURE: 107 MMHG | DIASTOLIC BLOOD PRESSURE: 59 MMHG

## 2021-02-11 VITALS — SYSTOLIC BLOOD PRESSURE: 122 MMHG | DIASTOLIC BLOOD PRESSURE: 64 MMHG

## 2021-02-11 VITALS — SYSTOLIC BLOOD PRESSURE: 133 MMHG | DIASTOLIC BLOOD PRESSURE: 76 MMHG

## 2021-02-11 VITALS — DIASTOLIC BLOOD PRESSURE: 69 MMHG | SYSTOLIC BLOOD PRESSURE: 113 MMHG

## 2021-02-11 VITALS — SYSTOLIC BLOOD PRESSURE: 114 MMHG | DIASTOLIC BLOOD PRESSURE: 69 MMHG

## 2021-02-11 VITALS — SYSTOLIC BLOOD PRESSURE: 120 MMHG | DIASTOLIC BLOOD PRESSURE: 65 MMHG

## 2021-02-11 VITALS — SYSTOLIC BLOOD PRESSURE: 123 MMHG | DIASTOLIC BLOOD PRESSURE: 72 MMHG

## 2021-02-11 VITALS — DIASTOLIC BLOOD PRESSURE: 70 MMHG | SYSTOLIC BLOOD PRESSURE: 124 MMHG

## 2021-02-11 VITALS — SYSTOLIC BLOOD PRESSURE: 144 MMHG | DIASTOLIC BLOOD PRESSURE: 69 MMHG

## 2021-02-11 VITALS — DIASTOLIC BLOOD PRESSURE: 68 MMHG | SYSTOLIC BLOOD PRESSURE: 110 MMHG

## 2021-02-11 VITALS — DIASTOLIC BLOOD PRESSURE: 80 MMHG | SYSTOLIC BLOOD PRESSURE: 136 MMHG

## 2021-02-11 VITALS — DIASTOLIC BLOOD PRESSURE: 73 MMHG | SYSTOLIC BLOOD PRESSURE: 120 MMHG

## 2021-02-11 VITALS — SYSTOLIC BLOOD PRESSURE: 118 MMHG | DIASTOLIC BLOOD PRESSURE: 71 MMHG

## 2021-02-11 VITALS — SYSTOLIC BLOOD PRESSURE: 109 MMHG | DIASTOLIC BLOOD PRESSURE: 66 MMHG

## 2021-02-11 VITALS — DIASTOLIC BLOOD PRESSURE: 70 MMHG | SYSTOLIC BLOOD PRESSURE: 115 MMHG

## 2021-02-11 VITALS — DIASTOLIC BLOOD PRESSURE: 82 MMHG | SYSTOLIC BLOOD PRESSURE: 138 MMHG

## 2021-02-11 VITALS — DIASTOLIC BLOOD PRESSURE: 65 MMHG | SYSTOLIC BLOOD PRESSURE: 116 MMHG

## 2021-02-11 LAB
ANION GAP SERPL CALC-SCNC: 12 MMOL/L (ref 7–16)
BUN SERPL-MCNC: 95 MG/DL (ref 7–18)
CALCIUM SERPL-MCNC: 8.6 MG/DL (ref 8.5–10.1)
CHLORIDE SERPL-SCNC: 108 MMOL/L (ref 98–107)
CO2 SERPL-SCNC: 24 MMOL/L (ref 21–32)
CREAT SERPL-MCNC: 2.1 MG/DL (ref 0.7–1.3)
ERYTHROCYTE [DISTWIDTH] IN BLOOD BY AUTOMATED COUNT: 18.5 % (ref 10.5–14.5)
GLUCOSE SERPL-MCNC: 303 MG/DL (ref 74–106)
HCT VFR BLD CALC: 24.4 % (ref 42–52)
HGB BLD-MCNC: 7.7 GM/DL (ref 14–18)
MCH RBC QN AUTO: 29.8 PG (ref 26–34)
MCHC RBC AUTO-ENTMCNC: 31.8 G/DL (ref 28–37)
MCV RBC: 93.7 FL (ref 80–100)
PLATELET # BLD: 258 THOU/UL (ref 150–400)
POTASSIUM SERPL-SCNC: 4.5 MMOL/L (ref 3.5–5.1)
RBC # BLD AUTO: 2.6 MIL/UL (ref 4.5–6)
SODIUM SERPL-SCNC: 144 MMOL/L (ref 136–145)
WBC # BLD AUTO: 25.4 THOU/UL (ref 4–11)

## 2021-02-11 NOTE — NUR
2/11/21 PATIENTS CENTRAL LINE REMOVED, TIP SENT FOR CULTURE. PERIPHERIAL IV
PLACED BY VASCULAR ACCESS NURSE. COVID NEGATIVE, ISOLATION PRECAUTIONS REMOVED
PER ID TEAM. SURGERY CONSULTED. CONTINUE ON TRACH SHIELD AT 35%. PATIENT
TRANSFERED TO CCU, REPORT GIVEN TO WILLARD. PATIENT'S SON UPDATED ON CARE AND
TRANSFER TO NEW UNIT.

## 2021-02-11 NOTE — NUR
chart review. unable to visit with keron. tired calling his son lanette listed as
contact, did not leave message rt voice message did not say mercedes. Pt is
from ltc at [facility]. " he been with them since oct/nov
2020 [facility] and had wounds"/[facility] liaison. keron has trach
with mask, peg. needs assist with all adls. wheel chair. will cont following
as needed for dc needs.

## 2021-02-11 NOTE — NUR
PATIENT ARRIVED AT THE UNIT APPROX 1900. PATIENT APHASIC. NO S/S SOB,N/V.
TRACH AND PEG IN PLACE. V PACED. HYPOTENSIVE ON LEVO GTT. FAMILY UDATED.
PATIENT TAKEN TO CT AT APPROX 0600. TOLERATED WELL. LEVO TITRATED OFF APROX
0400. AFEBRILE. WILL KEEP MONITORING

## 2021-02-11 NOTE — NUR
REC Glucerna 1.2 at 70 mL/hr x 24 hrs with maciel BID and beneprotein powder
BID is water flushes can be ordered/IVF decreased.

## 2021-02-12 VITALS — DIASTOLIC BLOOD PRESSURE: 74 MMHG | SYSTOLIC BLOOD PRESSURE: 123 MMHG

## 2021-02-12 VITALS — DIASTOLIC BLOOD PRESSURE: 88 MMHG | SYSTOLIC BLOOD PRESSURE: 125 MMHG

## 2021-02-12 VITALS — SYSTOLIC BLOOD PRESSURE: 136 MMHG | DIASTOLIC BLOOD PRESSURE: 88 MMHG

## 2021-02-12 VITALS — DIASTOLIC BLOOD PRESSURE: 95 MMHG | SYSTOLIC BLOOD PRESSURE: 147 MMHG

## 2021-02-12 VITALS — DIASTOLIC BLOOD PRESSURE: 74 MMHG | SYSTOLIC BLOOD PRESSURE: 116 MMHG

## 2021-02-12 LAB
ANION GAP SERPL CALC-SCNC: 13 MMOL/L (ref 7–16)
BUN SERPL-MCNC: 74 MG/DL (ref 7–18)
CALCIUM SERPL-MCNC: 8.2 MG/DL (ref 8.5–10.1)
CHLORIDE SERPL-SCNC: 114 MMOL/L (ref 98–107)
CO2 SERPL-SCNC: 22 MMOL/L (ref 21–32)
CREAT SERPL-MCNC: 1.7 MG/DL (ref 0.7–1.3)
ERYTHROCYTE [DISTWIDTH] IN BLOOD BY AUTOMATED COUNT: 18.6 % (ref 10.5–14.5)
GLUCOSE SERPL-MCNC: 157 MG/DL (ref 74–106)
HCT VFR BLD CALC: 25.3 % (ref 42–52)
HGB BLD-MCNC: 8 GM/DL (ref 14–18)
MCH RBC QN AUTO: 29.7 PG (ref 26–34)
MCHC RBC AUTO-ENTMCNC: 31.7 G/DL (ref 28–37)
MCV RBC: 93.8 FL (ref 80–100)
PLATELET # BLD: 257 THOU/UL (ref 150–400)
POTASSIUM SERPL-SCNC: 3.5 MMOL/L (ref 3.5–5.1)
RBC # BLD AUTO: 2.69 MIL/UL (ref 4.5–6)
SODIUM SERPL-SCNC: 149 MMOL/L (ref 136–145)
WBC # BLD AUTO: 19 THOU/UL (ref 4–11)

## 2021-02-12 NOTE — NUR
ASSUMED CARE AT CHANGE OF SHIFT. AWAKE, NON RESPONSIVE, WILL TACK WITH EYES.
TRACH WITH SUCTION CARE PROVIDED. REPOSTIONED Q2HR. DRESSING TO BILATERAL
HEELS/FOOD COMPLETED PER ORDERS. IV ANTIBIOTICS GIVE PER ORDERS. DOES NOT
APPEAR IN PAIN. BLOOD SUGARS WNL. VSS. STAFF TO ANTICIPATE NEEDS. COLOSTOMY
125 OUTPUT.

## 2021-02-12 NOTE — NUR
PATIENT CONTINUES IN A VEGATATIVE STATED,HOWEVER BY THE END OF THIS SHIFT HE
DID OPEN HIS EYES. PATIENT HAS COPIOUS AMOUNT OF CONGESTION. ORDER WAS
OBTAINED FOR A DEEP SUCTION. PATIENT WAS TURNED AND A POSSIABLE D/C BACK TO
HIS FACILITY.

## 2021-02-12 NOTE — NUR
FAXED CLINICAL UPDAATES TO [facility]. WILL CONFIRM THEY RECEIVED.
[facility] P [phone]; [phone]

## 2021-02-12 NOTE — NUR
Case discussed with the care team. No weekend dc anticipated. Surgery consult
in progress. Pt is being treated for severe sepsis and on iv atb. O2 per katie sevilla. [facility] updated. Will follow.

## 2021-02-13 VITALS — DIASTOLIC BLOOD PRESSURE: 76 MMHG | SYSTOLIC BLOOD PRESSURE: 139 MMHG

## 2021-02-13 VITALS — SYSTOLIC BLOOD PRESSURE: 165 MMHG | DIASTOLIC BLOOD PRESSURE: 96 MMHG

## 2021-02-13 VITALS — SYSTOLIC BLOOD PRESSURE: 139 MMHG | DIASTOLIC BLOOD PRESSURE: 76 MMHG

## 2021-02-13 VITALS — DIASTOLIC BLOOD PRESSURE: 89 MMHG | SYSTOLIC BLOOD PRESSURE: 151 MMHG

## 2021-02-13 VITALS — SYSTOLIC BLOOD PRESSURE: 144 MMHG | DIASTOLIC BLOOD PRESSURE: 76 MMHG

## 2021-02-13 LAB
ANION GAP SERPL CALC-SCNC: 13 MMOL/L (ref 7–16)
BUN SERPL-MCNC: 55 MG/DL (ref 7–18)
CALCIUM SERPL-MCNC: 8.9 MG/DL (ref 8.5–10.1)
CHLORIDE SERPL-SCNC: 115 MMOL/L (ref 98–107)
CO2 SERPL-SCNC: 22 MMOL/L (ref 21–32)
CREAT SERPL-MCNC: 1.5 MG/DL (ref 0.7–1.3)
ERYTHROCYTE [DISTWIDTH] IN BLOOD BY AUTOMATED COUNT: 18.1 % (ref 10.5–14.5)
GLUCOSE SERPL-MCNC: 175 MG/DL (ref 74–106)
HCT VFR BLD CALC: 17.4 % (ref 42–52)
HCT VFR BLD CALC: 29.8 % (ref 42–52)
HGB BLD-MCNC: 5.7 GM/DL (ref 14–18)
HGB BLD-MCNC: 9.6 GM/DL (ref 14–18)
MCH RBC QN AUTO: 30.3 PG (ref 26–34)
MCHC RBC AUTO-ENTMCNC: 32.5 G/DL (ref 28–37)
MCV RBC: 93.3 FL (ref 80–100)
PLATELET # BLD: 399 THOU/UL (ref 150–400)
POTASSIUM SERPL-SCNC: 3.9 MMOL/L (ref 3.5–5.1)
RBC # BLD AUTO: 1.87 MIL/UL (ref 4.5–6)
SODIUM SERPL-SCNC: 150 MMOL/L (ref 136–145)
WBC # BLD AUTO: 18.3 THOU/UL (ref 4–11)

## 2021-02-13 NOTE — NUR
PT IS AWAKE, NON VERBAL. UNABLE TO ASSESS ORIENTATION. PT IS ABLE TO TRACK
WITH EYES FOR SHORT PERIOD OF TIME. PT IS EDEMATOUS IN UPPER EXTREMITIES. DR VEGA CONSULTED. RT CONSULTED. WOUND CARE PROVIDED. PT BEGAN TUBE FEEDING,
GLUCERNA 1.2 AT 70ML/HR. GOAL RATE 70ML/HR. PT ON AIR LOSS MATTRESS, Q2H
TURNS. WOUND CARE PROVIDED. FALL PRECAUTIONS IN PLACE. POC TO CONTINUE
MONITORING O2, SEPSIS, TUBE FEEDING, AND WOUND CARE.

## 2021-02-14 VITALS — DIASTOLIC BLOOD PRESSURE: 78 MMHG | SYSTOLIC BLOOD PRESSURE: 160 MMHG

## 2021-02-14 VITALS — DIASTOLIC BLOOD PRESSURE: 89 MMHG | SYSTOLIC BLOOD PRESSURE: 171 MMHG

## 2021-02-14 VITALS — DIASTOLIC BLOOD PRESSURE: 75 MMHG | SYSTOLIC BLOOD PRESSURE: 156 MMHG

## 2021-02-14 VITALS — DIASTOLIC BLOOD PRESSURE: 94 MMHG | SYSTOLIC BLOOD PRESSURE: 163 MMHG

## 2021-02-14 VITALS — DIASTOLIC BLOOD PRESSURE: 86 MMHG | SYSTOLIC BLOOD PRESSURE: 147 MMHG

## 2021-02-14 LAB
ANION GAP SERPL CALC-SCNC: 12 MMOL/L (ref 7–16)
BUN SERPL-MCNC: 49 MG/DL (ref 7–18)
CALCIUM SERPL-MCNC: 9 MG/DL (ref 8.5–10.1)
CHLORIDE SERPL-SCNC: 115 MMOL/L (ref 98–107)
CO2 SERPL-SCNC: 23 MMOL/L (ref 21–32)
CREAT SERPL-MCNC: 1.4 MG/DL (ref 0.7–1.3)
ERYTHROCYTE [DISTWIDTH] IN BLOOD BY AUTOMATED COUNT: 18.5 % (ref 10.5–14.5)
GLUCOSE SERPL-MCNC: 215 MG/DL (ref 74–106)
HCT VFR BLD CALC: 28.4 % (ref 42–52)
HGB BLD-MCNC: 9 GM/DL (ref 14–18)
MCH RBC QN AUTO: 30 PG (ref 26–34)
MCHC RBC AUTO-ENTMCNC: 31.6 G/DL (ref 28–37)
MCV RBC: 95 FL (ref 80–100)
PLATELET # BLD: 292 THOU/UL (ref 150–400)
POTASSIUM SERPL-SCNC: 3.7 MMOL/L (ref 3.5–5.1)
RBC # BLD AUTO: 2.99 MIL/UL (ref 4.5–6)
SODIUM SERPL-SCNC: 150 MMOL/L (ref 136–145)
WBC # BLD AUTO: 10.3 THOU/UL (ref 4–11)

## 2021-02-14 NOTE — NUR
PT IS AWAKE, NON VERBAL. PT ON TUBE FEEDING GLUCERNA 1.2 AT 70 ML/HR. GOAL
RATE IS 70 ML/HR. PT COLOSTOMY APPEARS DISTENDED. DR VEGA CONSULTED. WOUND
CARE CONDUCTED WITH WOUND CARE DREverardo JONES CONSULTED. POC IS TO CONTINUE ABX,
MONITOR O2 SATS. FALL PRECAUTIONS IN PLACE. NO CONCERNS AT THIS TIME.

## 2021-02-15 VITALS — DIASTOLIC BLOOD PRESSURE: 95 MMHG | SYSTOLIC BLOOD PRESSURE: 172 MMHG

## 2021-02-15 VITALS — SYSTOLIC BLOOD PRESSURE: 162 MMHG | DIASTOLIC BLOOD PRESSURE: 77 MMHG

## 2021-02-15 VITALS — DIASTOLIC BLOOD PRESSURE: 83 MMHG | SYSTOLIC BLOOD PRESSURE: 165 MMHG

## 2021-02-15 VITALS — DIASTOLIC BLOOD PRESSURE: 90 MMHG | SYSTOLIC BLOOD PRESSURE: 151 MMHG

## 2021-02-15 LAB
ANION GAP SERPL CALC-SCNC: 9 MMOL/L (ref 7–16)
BUN SERPL-MCNC: 41 MG/DL (ref 7–18)
CALCIUM SERPL-MCNC: 8.9 MG/DL (ref 8.5–10.1)
CHLORIDE SERPL-SCNC: 113 MMOL/L (ref 98–107)
CO2 SERPL-SCNC: 25 MMOL/L (ref 21–32)
CREAT SERPL-MCNC: 1.2 MG/DL (ref 0.7–1.3)
ERYTHROCYTE [DISTWIDTH] IN BLOOD BY AUTOMATED COUNT: 18 % (ref 10.5–14.5)
GLUCOSE SERPL-MCNC: 215 MG/DL (ref 74–106)
HCT VFR BLD CALC: 26.3 % (ref 42–52)
HGB BLD-MCNC: 8.5 GM/DL (ref 14–18)
MCH RBC QN AUTO: 30.1 PG (ref 26–34)
MCHC RBC AUTO-ENTMCNC: 32.2 G/DL (ref 28–37)
MCV RBC: 93.4 FL (ref 80–100)
PLATELET # BLD: 295 THOU/UL (ref 150–400)
POTASSIUM SERPL-SCNC: 4.2 MMOL/L (ref 3.5–5.1)
RBC # BLD AUTO: 2.81 MIL/UL (ref 4.5–6)
SODIUM SERPL-SCNC: 147 MMOL/L (ref 136–145)
WBC # BLD AUTO: 10.2 THOU/UL (ref 4–11)

## 2021-02-15 NOTE — NUR
ON-GOING ASSESSMENT: CM REVIEWED CHART AND SPOKE WITH ATTENDING WHO REPORTS PT
IS NEARING HIS DISCHARGE GOAL AND POSSIBLY READY FOR D/C IN THE NEXT 1-2 DAYS.
CM NOTIFIED [facility] AND THAT PT REMAINS ON IV ANBX. SHE REPORTS THEY CAN ACCEPT
HIM ON THE ZOSYN Q8 AS LONG AS IT IS SHORT TERM. CM AWAITING TO SEE HOW LONG
PT WILL NEED IV ANBX AND WILL UPDATE LIASON. CM WILL CONTINUE TO FOLLOW TO
ASSIST AS NEEDED.

## 2021-02-15 NOTE — NUR
PT TRANSFERRED FROM CCU AT 0100.  COLOSTOMY--MARSH--TRACH--TUBE FEEDING.  NO
CODE--NONVERBAL.  NO APPARENT PAIN.  SUCTION PRN.

## 2021-02-15 NOTE — HC
[facility]
[address]
[place]   [address]                     CONSULTATION                  
_______________________________________________________________________________
 
Name:       VICKI PADRON               Room #:         445-P       ADM IN  
M.R.#:      [id number]                       Account #:      [id number]  
Admission:  02/10/21    Attend Phys:    Jack Lerma MD     
Discharge:              Date of Birth:  [birth year]  
                                                          Report #: [id number]
                                                                    [id number]   
_______________________________________________________________________________
THIS REPORT FOR:  
 
cc:  FAM - Family physician unknown
     FAM - Family physician unknown                                       
     Edison Bernard MD                                            ~
 
DATE OF SERVICE:  02/11/2021
 
 
INFECTIOUS DISEASE CONSULTATION
 
REASON FOR CONSULTATION:  I was asked to evaluate concerning septic shock.
 
HISTORY OF PRESENT ILLNESS:  The patient is a 70-year-old with history of
hypertension, stroke with aphasia and chronic vegetative state, who has been at
the local nursing home.  He has underlying diabetes.  He is quadriplegic.  He
has extensive sacral decubitus and had worsening hypotension and lethargy with
hypoxia, brought into the Emergency Room, where he was hypotensive with lactic
acidosis, acute kidney injury, marked leukocytosis and subsequently identified
Serratia from his blood cultures.
 
The patient was unable to give any further details, but reviewed with the
nursing staff at the bedside, notes that he has had a chronic tracheostomy.  He
has an extensive sacral decubitus with exposed bone.  He has ulcers to his feet
as well from pressure wounds along with a right IJ central venous catheter
duration of which is not clear.  He has a PEG tube and an indwelling Gipson
catheter.  There has been no seizure activity.  He remains on a trach shield
with no ventilatory requirements.
 
A 14-point review was negative other than what has been described above.
 
ALLERGIES:  None known.
 
MEDICATIONS:  As noted on his MAR, was on Levophed earlier.  He is off this now.
 He is on vancomycin and Zosyn.  Other meds as noted on his MAR.
 
PAST MEDICAL HISTORY:  Stroke, hypertension, diabetes, malnutrition, pressure
wounds, chronic vegetative state.
 
FAMILY HISTORY:  Not available.
 
SOCIAL HISTORY:  Not available.
 
PHYSICAL EXAMINATION:
VITAL SIGNS:  He is afebrile and hemodynamically stable.
SKIN:  With several pressure wounds to both feet.  He had extensive stage IV
 
 
 
[facility]
[address] [place]   [address]                     CONSULTATION                  
_______________________________________________________________________________
 
Name:       VICKI PADRON               Room #:         445-P       ADM IN  
M.R.#:      [id number]                       Account #:      [id number]  
Admission:  02/10/21    Attend Phys:    Jack Lerma MD     
Discharge:              Date of Birth:  [birth year]  
                                                          Report #: [id number]
                                                                    [id number]   
_______________________________________________________________________________
 
decubitus to his sacrum with exposed coccyx.  There is no purulent drainage. 
There were some fibrinous debris that otherwise had reasonable granulation
tissue.  There is no surrounding cellulitis.  There is no tunneling.
HEENT:  Eyes without scleral icterus.  Mouth without mucositis.  Tracheostomy
without drainage.
NECK:  Supple.
LUNGS:  Clear.
HEART:  Regular without appreciable murmur, gallop or rub.
ABDOMEN:  Soft.  There was no mass or hepatosplenomegaly identified. 
Tracheostomy without drainage.  He had a colostomy with some prolapse of the
colon.
GENITOURINARY:  External genitalia without mass or lesion, had indwelling Gipson
catheter.
RECTAL:  Not performed.  The patient did not respond to verbal or painful
stimuli.
EXTREMITIES:  Right IJ catheter site was without drainage.
 
LABORATORY STUDIES:  Reviewed.
 
MICROBIOLOGY:  Reviewed.
 
CT scan of the abdomen, pelvis, and chest x-ray reviewed.
 
IMPRESSION:  A 70-year-old with previous stroke, chronic vegetative state,
presents with gram-negative septic shock due to Serratia.  Source central venous
catheter versus urinary tract versus cholecystitis versus sacral wound and
osteomyelitis versus pneumonia.  He is post-stroke.  Underlying diabetes. 
Chronic tracheostomy.  Acute kidney injury.  Lactic acidosis due to his sepsis.
 
RECOMMENDATIONS:  We will continue broad antibiotics, pending culture results
and sensitivities of the Serratia.  _____ central venous catheter.  Await urine
culture.  I have General Surgery evaluate his gallbladder.  Continue wound care
and offloading.  Full ICU support.
 
 
 
 
 
 
 
 
 
 
  <ELECTRONICALLY SIGNED>
   By: Edison Bernard MD            
  02/15/21     1538
D: 02/11/21 1251                           _____________________________________
T: 02/11/21 1305                           Edison Bernard MD              /nt

## 2021-02-16 VITALS — SYSTOLIC BLOOD PRESSURE: 146 MMHG | DIASTOLIC BLOOD PRESSURE: 92 MMHG

## 2021-02-16 VITALS — SYSTOLIC BLOOD PRESSURE: 153 MMHG | DIASTOLIC BLOOD PRESSURE: 88 MMHG

## 2021-02-16 NOTE — HC
[facility]
[address]
[place]   [address]                     CONSULTATION                  
_______________________________________________________________________________
 
Name:       VICKI PADRON               Room #:         445-P       ADM IN  
M.R.#:      [id number]                       Account #:      [id number]  
Admission:  02/10/21    Attend Phys:    Jcak Lerma MD     
Discharge:              Date of Birth:  [birth year]  
                                                          Report #: [id number]
                                                                    [id number]   
_______________________________________________________________________________
THIS REPORT FOR:  
 
cc:  FAM - Family physician unknown
     FAM - Family physician unknown                                       
     Dennis Root MD                                        ~
 
DATE OF SERVICE:  02/11/2021
 
 
CHIEF COMPLAINT:  Multiple ulcerations.
 
HISTORY OF PRESENT ILLNESS:  This is a 70-year-old male patient who was admitted
to the hospital through the Emergency Department.  He has a history of
quadriplegia, previous cerebrovascular accident, hypertension, diabetes, chronic
respiratory failure with tracheostomy placement.  He has been brought in for
change in his vital signs and shortness of breath.  I have been asked to see him
with regard to wound care.  He has known stage 4 sacral pressure ulcerations and
some lower extremity ulcerations.  We have seen him last in 12/2020.  We do not
follow him on an outpatient basis and I have not seen him since discharge, the
patient is unable to provide any information about himself.
 
PAST MEDICAL HISTORY:  Positive for history of respiratory failure with chronic
tracheostomy, history of encephalopathy, renal failure, cerebrovascular
accident, functional quadriplegia, severe protein-calorie malnutrition and type
2 diabetes mellitus.
 
MEDICATIONS:  Include Glucerna, ProSource, vitamin C, Lovenox, FiberCon,
glucagon, glycopyrrolate, Imodium, ipratropium, albuterol, Kayexalate, Levemir,
metformin, metoprolol, NovoLog, pantoprazole.
 
SOCIAL HISTORY:  Unknown.
 
FAMILY HISTORY:  Unknown.
 
REVIEW OF SYSTEMS:  Unobtainable due to the patient's condition.
 
PHYSICAL EXAMINATION:
VITAL SIGNS:  At this time include temperature 35.9, pulse 78, respiratory rate
20, blood pressure 106/80.
GENERAL:  This is a chronically ill-appearing male patient who is minimally
responsive.
HEENT:  Head normocephalic.  Nose is clear.
NECK:  Demonstrates tracheostomy with a trach shield in place.
LUNGS:  Coarse diminished.
HEART:  Regular rate and rhythm.
ABDOMEN:  Soft, nontender.  Sacral region demonstrates stage 4 sacral ulceration
 
 
 
[facility]
[address] [place]   [address]                     CONSULTATION                  
_______________________________________________________________________________
 
Name:       VICKI PADRON               Room #:         445-P       [facility] [place]  
.R.#:      [id number]                       Account #:      [id number]  
Admission:  02/10/21    Attend Phys:    Jack Lerma MD     
Discharge:              Date of Birth:  [birth year]  
                                                          Report #: [id number]
                                                                    [id number]   
_______________________________________________________________________________
 
with exposed bone.  It is not overtly infected, does not appear to be much
improved since we last saw him.
EXTREMITIES:  Lower extremities demonstrate multiple ulcerations including dry
stable eschar involving the left medial heel and left lateral ankle with a stage
3 pressure ulceration and stage 3 pressure ulceration of the right first MTP
region.
NEUROLOGIC:  The patient is flaccid, nonverbal.
 
LABORATORY DATA:  Laboratory studies include white blood cell count 25.4 up from
5.1 on admission; hemoglobin 7.7; hematocrit 24.4; platelet count 258,000. 
Sodium 140, potassium 5.2, chloride 102, CO2 of 27, , creatinine 2.2,
glucose 254, total protein 7.3, albumin is 1.7.
 
CLINICAL IMPRESSION:
1.  Stage 4 sacral pressure ulceration.
2.  Stage 3 pressure ulceration to the left lateral ankle and right first
metatarsophalangeal.
3.  Unstageable pressure ulceration to the left medial heel with dry stable
eschar in place.
 
RECOMMENDATIONS:  At this point in time, we will recommend quarter strength
Dakin's moist gauze b.i.d. to the sacral ulcer.  He will need low air loss
mattress and q. 2 hour turning and positioning.  Recommend Betadine paint to the
ulcer of the lower extremities and PRAFO boots for pressure prophylaxis.  The
patient will need ongoing aggressive nutritional support.  Hospitalist is
managing diabetes and other chronic medical issues.  One could consider surgical
debridement of the sacral ulceration, although I do not know that the other
issues would allow for such at present.  I appreciate being asked to see him in
consultation.
 
 
 
 
 
 
 
 
 
 
 
 
 
 
  <ELECTRONICALLY SIGNED>
   By: Dennis Root MD        
  02/16/21     1016
D: 02/11/21 1839                           _____________________________________
T: 02/11/21 1933                           Dennis Root MD          /nt

## 2021-02-16 NOTE — NUR
APHASIC. Q2HR TURN.ON TRACH MASK @35%.CONTINUES ON TUBE FEEDINGS.
AFEBRILE.100% ON THE SAT MONITOR.SUGARS ON HIGHER SIDE BUT PT IS ON DEXTROSE.

## 2021-02-16 NOTE — NUR
DISCHARGE TO [facility] CALLED REPORT TO ERNESTO. TOOK W/C PICTURES,
CLEANED PATIENT AND PACKED BELONGINGS.

## 2021-02-16 NOTE — NUR
ON-GOING ASSESSMENT: CM REVIEWED CHART AND SPOKE WITH ATTENDING WHO REPORTS PT
CAN D/C TODAY. CM NOTIFIED LIASON AT [facility] WHO REPORTS THEY CAN ACCEPT PATIENT
TODAY. CM FAXED D/C ORDERS TO FACILITY AND CONFIRMED THEY RECEIVED IT. ROSENDO
SPOKE WITH RT WHO REPORTS PT IS ON TRACH MASK WITH HUMIDIFIED OXYGEN BUT
STATES EQUIVALENT TO PATIENT BEING CHANGED TO 3L FOR TRANSPORT. CHART COPY WAS
ORDERED. ROSENDO SPOKE WITH SON/DPKATTY COHEN JR WHO IS AGREEABLE WITH DISCHARGE BACK
TO [facility] TODAY. LIALEEANN SHABAZZ IS AWARE PT WILL NEED TO REMAIN ON IV ANBX FOR 10
DAY TREATMENT AND STATES THEY CAN ACCEPT. CM NOTIFIED DISCHARGE PLANNER TO
ARRANGE AMBULANCE FOR PATIENT. OUTSIDE DNR FORM AS WELL AS [facility] FORM WAS
PLACED ON THE CHART. AMBULANCE HAS BEEN ARRANGED FOR 1600. BEDSIDE RN AND SON
AWARE AS WELL AS BEDSIDE RN HAS THR NUMBER FOR REPORT.

## 2021-02-16 NOTE — NUR
VIRGIL [facility]RE DEPT HERE TO TRANSPORT PATIENT ASSIST XS 3 TO [facility]. ALL BELONGINGS
SENT WITH PATIENT.

## 2021-02-25 ENCOUNTER — HOSPITAL ENCOUNTER (INPATIENT)
Dept: HOSPITAL 35 - ER | Age: 71
LOS: 5 days | Discharge: NURSING, REHAB OR LONG-TERM CARE | DRG: 871 | End: 2021-03-02
Attending: HOSPITALIST | Admitting: HOSPITALIST
Payer: COMMERCIAL

## 2021-02-25 VITALS — DIASTOLIC BLOOD PRESSURE: 81 MMHG | SYSTOLIC BLOOD PRESSURE: 160 MMHG

## 2021-02-25 VITALS — HEIGHT: 72.01 IN | BODY MASS INDEX: 19.18 KG/M2 | WEIGHT: 141.6 LBS

## 2021-02-25 DIAGNOSIS — L89.620: ICD-10-CM

## 2021-02-25 DIAGNOSIS — I50.9: ICD-10-CM

## 2021-02-25 DIAGNOSIS — R13.10: ICD-10-CM

## 2021-02-25 DIAGNOSIS — E87.0: ICD-10-CM

## 2021-02-25 DIAGNOSIS — J96.21: ICD-10-CM

## 2021-02-25 DIAGNOSIS — G47.33: ICD-10-CM

## 2021-02-25 DIAGNOSIS — Z93.0: ICD-10-CM

## 2021-02-25 DIAGNOSIS — E11.22: ICD-10-CM

## 2021-02-25 DIAGNOSIS — I13.0: ICD-10-CM

## 2021-02-25 DIAGNOSIS — I48.21: ICD-10-CM

## 2021-02-25 DIAGNOSIS — Z93.1: ICD-10-CM

## 2021-02-25 DIAGNOSIS — L89.154: ICD-10-CM

## 2021-02-25 DIAGNOSIS — I25.10: ICD-10-CM

## 2021-02-25 DIAGNOSIS — D64.9: ICD-10-CM
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
## 2021-02-25 DIAGNOSIS — I25.2: ICD-10-CM

## 2021-02-25 DIAGNOSIS — B37.89: ICD-10-CM

## 2021-02-25 DIAGNOSIS — N18.9: ICD-10-CM
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
## 2021-02-25 DIAGNOSIS — I69.354: ICD-10-CM

## 2021-02-25 DIAGNOSIS — Z86.711: ICD-10-CM

## 2021-02-25 DIAGNOSIS — N31.9: ICD-10-CM

## 2021-02-25 DIAGNOSIS — E87.5: ICD-10-CM

## 2021-02-25 DIAGNOSIS — A41.9: Primary | ICD-10-CM

## 2021-02-25 DIAGNOSIS — E43: ICD-10-CM

## 2021-02-25 DIAGNOSIS — T83.518A: ICD-10-CM

## 2021-02-25 DIAGNOSIS — K21.9: ICD-10-CM

## 2021-02-25 DIAGNOSIS — Z95.810: ICD-10-CM

## 2021-02-25 DIAGNOSIS — Z93.3: ICD-10-CM

## 2021-02-25 DIAGNOSIS — L89.890: ICD-10-CM

## 2021-02-25 DIAGNOSIS — N17.0: ICD-10-CM

## 2021-02-25 DIAGNOSIS — Z66: ICD-10-CM

## 2021-02-25 LAB
ANION GAP SERPL CALC-SCNC: 7 MMOL/L (ref 7–16)
ANISOCYTOSIS BLD QL SMEAR: (no result)
BACTERIA-REFLEX: (no result) /HPF
BASOPHILS NFR BLD AUTO: 1.3 % (ref 0–2)
BILIRUB UR-MCNC: NEGATIVE MG/DL
BUN SERPL-MCNC: 80 MG/DL (ref 7–18)
CALCIUM SERPL-MCNC: 9.5 MG/DL (ref 8.5–10.1)
CHLORIDE SERPL-SCNC: 106 MMOL/L (ref 98–107)
CO2 SERPL-SCNC: 26 MMOL/L (ref 21–32)
COLOR UR: YELLOW
CREAT SERPL-MCNC: 2 MG/DL (ref 0.7–1.3)
EOSINOPHIL NFR BLD: 2.1 % (ref 0–3)
ERYTHROCYTE [DISTWIDTH] IN BLOOD BY AUTOMATED COUNT: 17.7 % (ref 10.5–14.5)
GLUCOSE SERPL-MCNC: 306 MG/DL (ref 74–106)
GRANULOCYTES NFR BLD MANUAL: 71.3 % (ref 36–66)
HCT VFR BLD CALC: 26.1 % (ref 42–52)
HGB BLD-MCNC: 8.4 GM/DL (ref 14–18)
KETONES UR STRIP-MCNC: NEGATIVE MG/DL
LG PLATELETS BLD QL SMEAR: (no result)
LYMPHOCYTES NFR BLD AUTO: 18.6 % (ref 24–44)
MCH RBC QN AUTO: 30.5 PG (ref 26–34)
MCHC RBC AUTO-ENTMCNC: 32.2 G/DL (ref 28–37)
MCV RBC: 94.8 FL (ref 80–100)
MONOCYTES NFR BLD: 6.7 % (ref 1–8)
NEUTROPHILS # BLD: 6.6 THOU/UL (ref 1.4–8.2)
PLATELET # BLD: 398 THOU/UL (ref 150–400)
POTASSIUM SERPL-SCNC: 6.7 MMOL/L (ref 3.5–5.1)
RBC # BLD AUTO: 2.75 MIL/UL (ref 4.5–6)
RBC # UR STRIP: (no result) /UL
RBC #/AREA URNS HPF: (no result) /HPF (ref 0–2)
SODIUM SERPL-SCNC: 139 MMOL/L (ref 136–145)
SP GR UR STRIP: 1.01 (ref 1–1.03)
URINE CLARITY: (no result)
URINE GLUCOSE-RANDOM*: (no result)
URINE LEUKOCYTES-REFLEX: (no result)
URINE NITRITE-REFLEX: NEGATIVE
URINE PROTEIN (DIPSTICK): (no result)
URINE WBC-REFLEX: (no result) /HPF (ref 0–5)
UROBILINOGEN UR STRIP-ACNC: 0.2 E.U./DL (ref 0.2–1)
WBC # BLD AUTO: 9.2 THOU/UL (ref 4–11)

## 2021-02-25 PROCEDURE — 10081 I&D PILONIDAL CYST COMP: CPT

## 2021-02-25 NOTE — NUR
PT IS NONVERBAL. CHRONIC RESPIRATORY FAILURE AND TRACH PRESENT. TRACH SHIELD
AT 35 PERCENT. COLOSTOMY BAG PRESENT. CAME [facility] ADMISION
DIAGNOSIS HYPERKALEMAI MARSH CATH PRESNET. EYES AWAKE. WITTHDRAWS TO PAIN. OLD
CVA WITH PHASIA AND LEFT SIDE. BUT OVERALL MUSCLE GENERAL WEKNESS NOTED WITH
PT. PRESSURE ULECER TO COCCYX AND WOULD VAC ON PT PRESNET. WILL CONTINUE TO
ASSES AND MONITOR PER NURSING AT THIS TIME.

## 2021-02-26 VITALS — SYSTOLIC BLOOD PRESSURE: 140 MMHG | DIASTOLIC BLOOD PRESSURE: 91 MMHG

## 2021-02-26 VITALS — SYSTOLIC BLOOD PRESSURE: 167 MMHG | DIASTOLIC BLOOD PRESSURE: 96 MMHG

## 2021-02-26 VITALS — SYSTOLIC BLOOD PRESSURE: 148 MMHG | DIASTOLIC BLOOD PRESSURE: 81 MMHG

## 2021-02-26 VITALS — DIASTOLIC BLOOD PRESSURE: 83 MMHG | SYSTOLIC BLOOD PRESSURE: 163 MMHG

## 2021-02-26 VITALS — SYSTOLIC BLOOD PRESSURE: 122 MMHG | DIASTOLIC BLOOD PRESSURE: 93 MMHG

## 2021-02-26 VITALS — DIASTOLIC BLOOD PRESSURE: 93 MMHG | SYSTOLIC BLOOD PRESSURE: 122 MMHG

## 2021-02-26 VITALS — DIASTOLIC BLOOD PRESSURE: 91 MMHG | SYSTOLIC BLOOD PRESSURE: 140 MMHG

## 2021-02-26 VITALS — SYSTOLIC BLOOD PRESSURE: 160 MMHG | DIASTOLIC BLOOD PRESSURE: 104 MMHG

## 2021-02-26 LAB
ANION GAP SERPL CALC-SCNC: 10 MMOL/L (ref 7–16)
BUN SERPL-MCNC: 72 MG/DL (ref 7–18)
CALCIUM SERPL-MCNC: 9.8 MG/DL (ref 8.5–10.1)
CHLORIDE SERPL-SCNC: 108 MMOL/L (ref 98–107)
CO2 SERPL-SCNC: 23 MMOL/L (ref 21–32)
CREAT SERPL-MCNC: 1.5 MG/DL (ref 0.7–1.3)
ERYTHROCYTE [DISTWIDTH] IN BLOOD BY AUTOMATED COUNT: 17.8 % (ref 10.5–14.5)
GLUCOSE SERPL-MCNC: 216 MG/DL (ref 74–106)
HCT VFR BLD CALC: 23.3 % (ref 42–52)
HGB BLD-MCNC: 7.5 GM/DL (ref 14–18)
MCH RBC QN AUTO: 30.7 PG (ref 26–34)
MCHC RBC AUTO-ENTMCNC: 32.3 G/DL (ref 28–37)
MCV RBC: 95 FL (ref 80–100)
PLATELET # BLD: 326 THOU/UL (ref 150–400)
POTASSIUM SERPL-SCNC: 6 MMOL/L (ref 3.5–5.1)
RBC # BLD AUTO: 2.45 MIL/UL (ref 4.5–6)
SODIUM SERPL-SCNC: 141 MMOL/L (ref 136–145)
WBC # BLD AUTO: 6.5 THOU/UL (ref 4–11)

## 2021-02-26 NOTE — NUR
[facility] AND LEFT MSG WITH MELE IN
ADM.

## 2021-02-26 NOTE — NUR
Case opened to follow. Pt admits from [facility] where he
is a long term care resident. Pt known to  from previous admissions;most
recently dc'd back there on 2-16-21. The pt has used his skilled medicare days
and returned there [facility] care. He non ambulatory with trach/o2 at 3liters
trach mask requiring [facility] transport at [place]. Family contact is his son
Matt bustillo (aka [facility]). [facility] notes they are holding his bed and can
accept back when medically stable. No weekend dc anticipated and the pt is
being treated for ARF, Hyperkalemia and RT seeing due to trach
care/secretions. [facility] transport will need to be arranged thru
[facility] and the pt has no medicare part B benefits.

## 2021-02-26 NOTE — NUR
PT CARE ASSUMED AT 0700. ASSESSMENTS AS CHARTED. MEDICATIONS AS CHARTED. RH
IV. TRACH 0.35, LARGE THICK YELLOW SECRETIONS. PEG; GLUCERNA 1.2, 30/70 GOAL.
125 ML FLUSH Q6. SACRUM WOUND CARE. MARSH. COLOSTOMY, REPLACED. PT IS
NONVERBAL.

## 2021-02-26 NOTE — EKG
[address]
[place]  [address]
Phone:  [phone]                    ELECTROCARDIOGRAM REPORT      
_______________________________________________________________________________
 
Name:       VICKI PADRON               Room #:         217-P       ADM IN  
M.R.#:      [id number]     Account #:      [id number]  
Admission:  21    Attend Phys:    Joce Jonhston MD      
Discharge:              Date of Birth:  [birth year]  
                                                          Report #: [id number]
   [id number]
_______________________________________________________________________________
                         [facility] ED
                                       
Test Date:    2021               Test Time:    19:16:45
Pat Name:     VICKI PADRON          Department:   
Patient ID:   [id number]            Room:         [facility]
Gender:       M                        Technician:   CIPRIANO
:          1950               Requested By: Ap Perez
Order Number: [id number] MD:   Wenceslao Houser
                                 Measurements
Intervals                              Axis          
Rate:         85                       P:            47
WV:           179                      QRS:          148
QRSD:         100                      T:            75
QT:           384                                    
QTc:          457                                    
                           Interpretive Statements
Poor quality data, interpretation may be affected
Sinus rhythm
Right ventricular hypertrophy
Probable RV involvement, suggest recording right precordial leads
Artifact in lead(s) I,II,III,aVR,aVL,aVF,V1,V2,V3,V4,V5,V6 and baseline
wander in 
lead(s) V4
Compared to ECG 02/10/2021 10:43:49
Right ventricular hypertrophy now present
Atrial flutter no longer present
2:1 AV block no longer present
Left bundle-branch block no longer present
Electronically Signed On 2021 7:13:08 CST by Wenceslao Houser
[email or web address]
 
 
 
 
 
 
 
 
 
 
 
 
 
  <ELECTRONICALLY SIGNED>
   By: Wenceslao Houser MD, [facility]    
  21
D: 21                           _____________________________________
T: 21                           Wenceslao Houser MD, [facility]      /EPI

## 2021-02-27 VITALS — SYSTOLIC BLOOD PRESSURE: 147 MMHG | DIASTOLIC BLOOD PRESSURE: 85 MMHG

## 2021-02-27 VITALS — DIASTOLIC BLOOD PRESSURE: 97 MMHG | SYSTOLIC BLOOD PRESSURE: 179 MMHG

## 2021-02-27 VITALS — SYSTOLIC BLOOD PRESSURE: 139 MMHG | DIASTOLIC BLOOD PRESSURE: 80 MMHG

## 2021-02-27 VITALS — DIASTOLIC BLOOD PRESSURE: 87 MMHG | SYSTOLIC BLOOD PRESSURE: 161 MMHG

## 2021-02-27 VITALS — SYSTOLIC BLOOD PRESSURE: 155 MMHG | DIASTOLIC BLOOD PRESSURE: 91 MMHG

## 2021-02-27 VITALS — DIASTOLIC BLOOD PRESSURE: 91 MMHG | SYSTOLIC BLOOD PRESSURE: 165 MMHG

## 2021-02-27 LAB
ANION GAP SERPL CALC-SCNC: 9 MMOL/L (ref 7–16)
BUN SERPL-MCNC: 65 MG/DL (ref 7–18)
CALCIUM SERPL-MCNC: 9.9 MG/DL (ref 8.5–10.1)
CHLORIDE SERPL-SCNC: 110 MMOL/L (ref 98–107)
CO2 SERPL-SCNC: 25 MMOL/L (ref 21–32)
CREAT SERPL-MCNC: 1.6 MG/DL (ref 0.7–1.3)
ERYTHROCYTE [DISTWIDTH] IN BLOOD BY AUTOMATED COUNT: 18.4 % (ref 10.5–14.5)
GLUCOSE SERPL-MCNC: 324 MG/DL (ref 74–106)
HCT VFR BLD CALC: 26.9 % (ref 42–52)
HGB BLD-MCNC: 8.5 GM/DL (ref 14–18)
MCH RBC QN AUTO: 30.5 PG (ref 26–34)
MCHC RBC AUTO-ENTMCNC: 31.8 G/DL (ref 28–37)
MCV RBC: 95.9 FL (ref 80–100)
PLATELET # BLD: 443 THOU/UL (ref 150–400)
POTASSIUM SERPL-SCNC: 5.1 MMOL/L (ref 3.5–5.1)
RBC # BLD AUTO: 2.8 MIL/UL (ref 4.5–6)
SODIUM SERPL-SCNC: 144 MMOL/L (ref 136–145)
WBC # BLD AUTO: 7.9 THOU/UL (ref 4–11)

## 2021-02-27 NOTE — NUR
ASSUMED CARE SHIFT CHANGE.ASSESSMENTS CHARTED.MEDS GIVEN PER MAR. PT SLEEPING
MOST SHIFT, WHEN AWAKE PT NONRESPONSIVE. TUBE FEEDS CHANGED NEPRO PER RENAL,
CONTINUES 50ML/HR GOAL @70. TRACH SHIELD IN PLACE, O2 WNL 35%. COLOSTOMY BAG
REMAINS INTACT WITHOUT LEAKAGE SMALL LIQUID BOWEL NOTED. WOUND CARE PERFORMED
PER ORDERS. PT TURNED AS TOLERATED. URINE CULTURE RESULTS COMMUNICATED TO
INFECTIOUS DISEASE- ORDERS RECEIVED. PT CURRENTLY RESTING IN BED IN NAP.
CONTINUING POC. WILL PASS ON REPORT TO KAYLA HERRING.

## 2021-02-28 VITALS — DIASTOLIC BLOOD PRESSURE: 86 MMHG | SYSTOLIC BLOOD PRESSURE: 157 MMHG

## 2021-02-28 VITALS — DIASTOLIC BLOOD PRESSURE: 84 MMHG | SYSTOLIC BLOOD PRESSURE: 147 MMHG

## 2021-02-28 VITALS — SYSTOLIC BLOOD PRESSURE: 133 MMHG | DIASTOLIC BLOOD PRESSURE: 72 MMHG

## 2021-02-28 VITALS — DIASTOLIC BLOOD PRESSURE: 75 MMHG | SYSTOLIC BLOOD PRESSURE: 143 MMHG

## 2021-02-28 VITALS — DIASTOLIC BLOOD PRESSURE: 94 MMHG | SYSTOLIC BLOOD PRESSURE: 145 MMHG

## 2021-02-28 LAB
ANION GAP SERPL CALC-SCNC: 8 MMOL/L (ref 7–16)
BUN SERPL-MCNC: 67 MG/DL (ref 7–18)
CALCIUM SERPL-MCNC: 9.8 MG/DL (ref 8.5–10.1)
CHLORIDE SERPL-SCNC: 112 MMOL/L (ref 98–107)
CO2 SERPL-SCNC: 27 MMOL/L (ref 21–32)
CREAT SERPL-MCNC: 1.6 MG/DL (ref 0.7–1.3)
ERYTHROCYTE [DISTWIDTH] IN BLOOD BY AUTOMATED COUNT: 17.8 % (ref 10.5–14.5)
GLUCOSE SERPL-MCNC: 286 MG/DL (ref 74–106)
HCT VFR BLD CALC: 27.6 % (ref 42–52)
HGB BLD-MCNC: 8.6 GM/DL (ref 14–18)
MCH RBC QN AUTO: 29.9 PG (ref 26–34)
MCHC RBC AUTO-ENTMCNC: 31.2 G/DL (ref 28–37)
MCV RBC: 95.8 FL (ref 80–100)
PLATELET # BLD: 458 THOU/UL (ref 150–400)
POTASSIUM SERPL-SCNC: 4.6 MMOL/L (ref 3.5–5.1)
RBC # BLD AUTO: 2.88 MIL/UL (ref 4.5–6)
SODIUM SERPL-SCNC: 147 MMOL/L (ref 136–145)
WBC # BLD AUTO: 7.7 THOU/UL (ref 4–11)

## 2021-02-28 NOTE — NUR
ASSESSMENT AS CHARTED. TURNED AND REPOSITIONED AS NEEDED. WOUND CARE PROVIDED.
TUBE FEEDING INFUSING AS ORDERED. NO CONCERNS AT THIS TIME.

## 2021-03-01 VITALS — DIASTOLIC BLOOD PRESSURE: 82 MMHG | SYSTOLIC BLOOD PRESSURE: 157 MMHG

## 2021-03-01 VITALS — DIASTOLIC BLOOD PRESSURE: 45 MMHG | SYSTOLIC BLOOD PRESSURE: 145 MMHG

## 2021-03-01 VITALS — SYSTOLIC BLOOD PRESSURE: 143 MMHG | DIASTOLIC BLOOD PRESSURE: 71 MMHG

## 2021-03-01 VITALS — DIASTOLIC BLOOD PRESSURE: 64 MMHG | SYSTOLIC BLOOD PRESSURE: 148 MMHG

## 2021-03-01 VITALS — DIASTOLIC BLOOD PRESSURE: 72 MMHG | SYSTOLIC BLOOD PRESSURE: 163 MMHG

## 2021-03-01 LAB
ALBUMIN SERPL-MCNC: 1.7 G/DL (ref 3.4–5)
ANION GAP SERPL CALC-SCNC: 8 MMOL/L (ref 7–16)
BUN SERPL-MCNC: 79 MG/DL (ref 7–18)
CALCIUM SERPL-MCNC: 10 MG/DL (ref 8.5–10.1)
CHLORIDE SERPL-SCNC: 112 MMOL/L (ref 98–107)
CO2 SERPL-SCNC: 27 MMOL/L (ref 21–32)
CREAT SERPL-MCNC: 1.6 MG/DL (ref 0.7–1.3)
GLUCOSE SERPL-MCNC: 160 MG/DL (ref 74–106)
PHOSPHATE SERPL-MCNC: 3 MG/DL (ref 2.5–4.9)
POTASSIUM SERPL-SCNC: 4.3 MMOL/L (ref 3.5–5.1)
SODIUM SERPL-SCNC: 147 MMOL/L (ref 136–145)

## 2021-03-01 NOTE — NUR
PT WITH CHRONIC TRACH, SUCTIONING PRN. PT AFEBRILE, ADEQUATE UOP, PEG IN PLACE
AND TOLERATING TUBE FEEDINGS, RESIDUALS WNL, OSTOMY BAG/APPLIANCE WAS CHANGED
AND SKIN ASSESSED/CLEANED BY WOUND NURSE, WOUNDS WERE ALSO
CLEANED/DRESSED/ASSESSED BY WOUND NURSE. PT WAS UPDATED AND EDUCATED ON PT
CONDITION AND POC, ATTEMPTED TO CALL FAMILY. PT SLOWLY PROGRESSING TOWARDS
POC.

## 2021-03-01 NOTE — HC
[facility]
[address]
[place]   [address]                     CONSULTATION                  
_______________________________________________________________________________
 
Name:       VICKI PADRON               Room #:         217-P       ADM IN  
M.R.#:      [id number]                       Account #:      [id number]  
Admission:  02/25/21    Attend Phys:    Jack Lerma MD     
Discharge:              Date of Birth:  [birth year]  
                                                          Report #: [id number]
                                                                    [id number]   
_______________________________________________________________________________
THIS REPORT FOR:  
 
cc:  LILLIG,MARY ANN               
     Physician not on staff                                               
     Edison Bernard MD                                            ~
 
DATE OF SERVICE:  02/26/2021
 
 
INFECTIOUS DISEASE CONSULTATION
 
REASON FOR CONSULTATION:  I was asked to evaluate concerning urinary tract
infection and sacral decubitus.
 
HISTORY OF PRESENT ILLNESS:  The patient is a 71-year-old, previous stroke, left
hemiparesis and chronic vegetative state who has a tracheostomy, a PEG tube,
chronic indwelling Gipson catheter, large sacral decubitus and a colostomy.  He
was hospitalized with Serratia bacteremia earlier this month.  He completed his
course of antibiotic therapy and now returns due to elevated potassium, acute
kidney injury and positive urinalysis.  He was placed on ceftriaxone.  There has
been no fever, chills or sweats noted.  The patient was unable to give any
further details.  He does have a stage 4 large sacral decubitus.  He also has an
eschar wound to his left heel, which have been chronic.  There has been no
vomiting episode.  He has had no diarrhea out of his colostomy.
 
REVIEW OF SYSTEMS:  A 14-point review was negative other than what has been
described above as discussed with nursing staff.
 
ALLERGIES:  None known.
 
MEDICATIONS:  As noted on his MAR, now including ceftriaxone.
 
PAST MEDICAL HISTORY:  Unchanged from his history and physical and that of my
previous consultation earlier in the month.
 
FAMILY HISTORY:  Unchanged from his history and physical and that of my previous
consultation earlier in the month.
 
SOCIAL HISTORY:  Unchanged from his history and physical and that of my previous
consultation earlier in the month.
 
PHYSICAL EXAMINATION:
VITAL SIGNS:  He was afebrile and hemodynamically stable.
GENERAL:  He was awake, but not following commands.  He had a dense left
hemiparesis.  Tracheostomy without surrounding erythema or drainage.  Peripheral
IV in place.
 
 
 
[facility]
[address]
[place]   [address]                     CONSULTATION                  
_______________________________________________________________________________
 
Name:       VICKI PADRON               Room #:         217-P       ADM IN  
M.R.#:      [id number]                       Account #:      [id number]  
Admission:  02/25/21    Attend Phys:    Jack Lerma MD     
Discharge:              Date of Birth:  [birth year]  
                                                          Report #: [id number]
                                                                    [id number]   
_______________________________________________________________________________
 
SKIN:  With an eschar to the left posterior heel and a large stage 4 sacral
decubitus with no purulent drainage or surrounding cellulitis.  No palpable
adenopathy.
EYES:  Without scleral icterus.
MOUTH:  Without mucositis.
CHEST:  Lungs were clear.
HEART:  Regular without appreciable murmur, gallop or rub.
ABDOMEN:  Soft, no masses or hepatosplenomegaly.  PEG site was without erythema
or drainage.  Colostomy mucosa was within normal limits.  Stool in the bag.
GENITOURINARY:  External genitalia without lesion with indwelling Gipson
catheter.
NEUROLOGIC:  Mood sedate.  Unable to converse.
 
LABORATORY STUDIES:  Reviewed.
 
MICROBIOLOGY:  Reviewed.
 
Chest x-ray reviewed.
 
IMPRESSION:  A 71-year-old with stroke, encephalopathy, chronic tracheostomy,
stage 4 sacral decubitus, presents with acute kidney injury on top of chronic
kidney disease with evidence of catheter-associated urinary tract infection.  So
far, no evidence for an acute abdomen.  No evidence of pneumonia.  He did have
Serratia bacteremia earlier in the month, which will need to be followed up.
 
Recommend continuing broad antibiotic coverage with inclusion of the Serratia
that we had seen before.  We will therefore use Merrem, adjusted for his renal
failure.  We will repeat his blood cultures.  Continue offloading his sacrum. 
Continue nutritional support.
 
 
 
 
 
 
 
 
 
 
 
 
 
 
  <ELECTRONICALLY SIGNED>
   By: Edison Bernard MD            
  03/01/21     1439
D: 02/26/21 1715                           _____________________________________
T: 02/26/21 2128                           Edison Bernard MD              /nt

## 2021-03-01 NOTE — NUR
OSTOMY CARE;
POUCH EDGES LOOSE, LEAKING, CHANGED APPLIANCE USING RODOLFO CUT TO FIT
APPLIANCE, STOMA PINK VIABLE BUDDED, LOOSE BROWN STOOL NOTED, PERISTOMAL SKIN
INTACT, ADAPT RING APPLIED UNDER WAFER, NONVERBAL
 
RECOMMENDATIONS;
CHANGE POUCH Q 3-5 DAYS AND PRN, EMPTY PRN
STAFF RN AWARE

## 2021-03-01 NOTE — HC
[facility]
[address]
[place]   [address]                     CONSULTATION                  
_______________________________________________________________________________
 
Name:       VICKI PADRON               Room #:         217-P       ADM IN  
M.R.#:      [id number]                       Account #:      [id number]  
Admission:  02/25/21    Attend Phys:    Jack Lerma MD     
Discharge:              Date of Birth:  [birth year]  
                                                          Report #: [id number]
                                                                    [id number]   
_______________________________________________________________________________
THIS REPORT FOR:  
 
cc:  LILLIG,MARY ANN               
     Physician not on staff                                               
     Giovani Ruiz MD                                          ~
 
DATE OF SERVICE:  02/26/2021
 
 
WOUND CARE CONSULTATION
 
PERSONAL PHYSICIAN:  Mary Ann Lillig, DO
 
CHIEF COMPLAINT:  Multiple decubitus ulcers.
 
HISTORY OF PRESENT ILLNESS:  This is a 71-year-old black male we have been
following for several months in various facilities, who has a history of
significant CVA with left-sided hemiparesis; respiratory failure, status post
trach placement; and chronic stage 4 sacral decubitus ulcer as well as decubitus
ulcers to his left heel and first metatarsal head on the right foot.  The
patient was admitted last night from the Emergency Department for hyperkalemia
with potassium of 6.7.  The patient himself is nonverbal, which is his baseline.
 We have been asked to follow the patient for these chronic wounds.
 
PAST MEDICAL HISTORY:  Significant for recurrent urinary tract infection with a
chronic indwelling Gipson catheter; chronic respiratory failure, status post
trach placement; atrial fibrillation; congestive heart failure; CVA with
left-sided hemiparesis; chronic ulcers of stage 4 to sacral region; type 2
diabetes; hypertension; status post colostomy placement; status post PEG tube
placement.
 
CURRENT MEDICATIONS:  Multiple.  I reviewed the patient's medication list.
 
DRUG ALLERGIES:  None.
 
SOCIAL HISTORY:  The patient resides in a long-term care facility at this time.
 
FAMILY HISTORY AND REVIEW OF SYSTEMS:  Unobtainable due to the patient's
nonverbal state.
 
PHYSICAL EXAMINATION:
VITAL SIGNS:  Temperature 36.3, pulse 122 and irregular, respiratory rate _____
140/91.
GENERAL:  This is awake, but nonverbal, chronically ill-appearing black male.
HEENT:  Normocephalic, atraumatic.  Mucous membranes are dry.  Pupils are round.
 Sclerae white.
 
 
 
[facility]
[address] [place]   [address]                     CONSULTATION                  
_______________________________________________________________________________
 
Name:       VICKI PADRON               Room #:         217-P       [facility] [place]  
M.R.#:      [id number]                       Account #:      [id number]  
Admission:  02/25/21    Attend Phys:    Jack Lerma MD     
Discharge:              Date of Birth:  [birth year]  
                                                          Report #: [id number]
                                                                    [id number]   
_______________________________________________________________________________
 
NECK:  Tracheostomy is in place with trach shield.
BACK:  Nontender.
LUNGS:  Slight diminished breath sounds heard throughout.
HEART:  Irregularly irregular and tachycardic.
ABDOMEN:  Soft, nontender.  PEG tube is in place.  Colostomy is in place.
EXTREMITIES:  Evaluation of sacral region reveals a stage 4 decubitus ulcer,
which is fairly clean and granulating.  There are bony spicules palpated in the
central portion.  There is no significant undermining or tunneling.  There is
moderate amount of serosanguineous drainage noted, but there is a slight odor. 
Periwound is otherwise intact.  The patient has bilateral lower extremity
contractures.  On the left heel, there is a dry unstageable _____ to the left
heel without signs of infection or cellulitis.  A 1+ dorsalis pedis pulse on the
right foot.  On the medial aspect of the first metatarsal head is an unstageable
ulcer with eschar with 1+ dorsalis pedis pulse.  No associated ulceration noted
on the right foot.
NEUROLOGIC:  The patient is essentially in a chronic vegetative state.
 
LABORATORY DATA:  White count 6.5, hemoglobin 7.5, potassium this morning was
6.0, BUN 72, creatinine 1.5, albumin 1.7.
 
IMPRESSION:
1.  Stage 4 sacral decubitus ulcer -- chronic.
2.  Unstageable decubitus ulcer, left heel -- chronic.
3.  Unstageable decubitus ulcer, first metatarsal head, medial aspect --
chronic.
4.  Coronary artery disease, status post ACID placement, hypertension, diabetes
type 2, acute on chronic respiratory failure, hyperkalemia, cerebrovascular
accident with left-sided hemiparesis.
5.  Severe protein-calorie malnutrition, albumin 1.7.
 
PLAN:  At this time, the patient has currently been treated with negative
pressure dressings.  However, at this time, while the patient is hospitalized,
we will hold these and just do Dakin's wet to dry dressings b.i.d. p.r.n.  _____
on a low air loss surface and have him turned every 2 hours.  We will start
painting the left heel and first metatarsal head ulcer with Betadine daily.  The
patient will be in his heel protection boots at all time.  We will utilize the
patient's PEG tube for try to maximize his nutrition for continued healing.  We
will continue all other current medications.  I appreciate ability to consult. 
We will continue to follow the patient.
 
 
 
 
  <ELECTRONICALLY SIGNED>
   By: Giovani Ruiz MD          
  03/01/21     1559
D: 02/26/21 1047                           _____________________________________
T: 02/26/21 1414                           Giovani Ruiz MD            /nt

## 2021-03-01 NOTE — NUR
ASSUMED PT CARE AT 1900, PT IS NONVERBAL, VPACED ON THE MONITOR, ASSESSMENTS
AS CHARTED, TUBE FEEDING AT GOAL, TOLERATING WELL, NO RESIDUAL, BS STABLE,
MEDS BGIVEN AS PER MAR, SUCTIONED PRN TOLERATING WELL, PROGRESSING TOWARDS POC

## 2021-03-01 NOTE — NUR
Nepro at 70ml/hr provides excessive nutrition and will overfeed pt.  Recommend
new goal rate 50ml/hr

## 2021-03-01 NOTE — NUR
FAXED CLINICAL UPDATES WITH WOUND CARE AND DIETARY NOTES TO [facility] CARE [facility].
[facility] P [phone]; [phone]

## 2021-03-02 VITALS — SYSTOLIC BLOOD PRESSURE: 156 MMHG | DIASTOLIC BLOOD PRESSURE: 87 MMHG

## 2021-03-02 VITALS — SYSTOLIC BLOOD PRESSURE: 160 MMHG | DIASTOLIC BLOOD PRESSURE: 74 MMHG

## 2021-03-02 VITALS — SYSTOLIC BLOOD PRESSURE: 162 MMHG | DIASTOLIC BLOOD PRESSURE: 98 MMHG

## 2021-03-02 NOTE — NUR
pt discharged to [facility] via ambulance on 35% face shield.
Report called.  all belongings with pt including what looked like a wound vac.

## 2021-03-02 NOTE — NUR
Patient to discharge to [facility] today. [facility] for [phone]. Notreta Rn and son.
Faxed orders and chart copy requested.

## 2021-03-02 NOTE — NUR
Assumed pt care at chnage of shift, pt is nonverbal, assessments as charted,
meds given as per mar, tube feeding infusing at goal, tolerating well,
suctioned prn, tolerating well, no acute distress noted, will continue to
monitor and follow poc

## 2023-09-05 NOTE — NUR
ASSUMED CARE OF THE PATIENT AT 1900; AWAKE, NONVERBAL, NONAMBULATING WITH
QUADRIPLEGIA; PT UNABLE TO STATE CONCERNS OR PAIN; VSS/ASSESSMENTS AS CHARTED;
TUBE FEED AT GOAL WITH Q6H WATER FLUSHES; MARSH IN PLACE WITH CATH CARE
COMPLETED; ORAL CARE COMPLETED PER INSTRUCTIONS; PLAN IS FOR PATIENT TO
CONTINUE CURRENT THERAPY WITH POSSIBLE D/C TO [facility] MONDAY; WILL CONTINUE TO
MONITOR AND FOLLOW POC. Called pt to follow up- LEONELA